# Patient Record
Sex: MALE | Race: WHITE | NOT HISPANIC OR LATINO | Employment: OTHER | URBAN - METROPOLITAN AREA
[De-identification: names, ages, dates, MRNs, and addresses within clinical notes are randomized per-mention and may not be internally consistent; named-entity substitution may affect disease eponyms.]

---

## 2017-06-22 PROBLEM — J96.01 ACUTE HYPOXEMIC RESPIRATORY FAILURE: Status: ACTIVE | Noted: 2017-06-22

## 2017-06-22 PROBLEM — J18.9 PNEUMONIA DUE TO INFECTIOUS ORGANISM: Status: ACTIVE | Noted: 2017-06-22

## 2017-06-23 PROBLEM — J80 ARDS (ADULT RESPIRATORY DISTRESS SYNDROME): Status: ACTIVE | Noted: 2017-06-23

## 2017-06-25 PROBLEM — J80 ARDS (ADULT RESPIRATORY DISTRESS SYNDROME): Status: ACTIVE | Noted: 2017-06-25

## 2018-02-26 ENCOUNTER — HOSPITAL ENCOUNTER (OUTPATIENT)
Dept: RADIOLOGY | Facility: HOSPITAL | Age: 83
Discharge: HOME | End: 2018-02-26
Attending: INTERNAL MEDICINE
Payer: MEDICARE

## 2018-02-26 ENCOUNTER — OFFICE VISIT (OUTPATIENT)
Dept: RHEUMATOLOGY | Facility: CLINIC | Age: 83
End: 2018-02-26
Payer: MEDICARE

## 2018-02-26 VITALS
SYSTOLIC BLOOD PRESSURE: 124 MMHG | DIASTOLIC BLOOD PRESSURE: 61 MMHG | HEART RATE: 77 BPM | WEIGHT: 163 LBS | BODY MASS INDEX: 22.08 KG/M2 | HEIGHT: 72 IN

## 2018-02-26 DIAGNOSIS — J84.10 PULMONARY FIBROSIS: Primary | ICD-10-CM

## 2018-02-26 DIAGNOSIS — R06.02 SHORTNESS OF BREATH: ICD-10-CM

## 2018-02-26 DIAGNOSIS — R76.8 ANA POSITIVE: ICD-10-CM

## 2018-02-26 DIAGNOSIS — R91.8 LUNG MASS: ICD-10-CM

## 2018-02-26 DIAGNOSIS — R53.1 WEAKNESS: ICD-10-CM

## 2018-02-26 DIAGNOSIS — G14 POST-POLIO SYNDROME: ICD-10-CM

## 2018-02-26 PROCEDURE — 71250 CT THORAX DX C-: CPT | Mod: 26,,, | Performed by: RADIOLOGY

## 2018-02-26 PROCEDURE — 99205 OFFICE O/P NEW HI 60 MIN: CPT | Mod: S$PBB,,, | Performed by: INTERNAL MEDICINE

## 2018-02-26 PROCEDURE — 99999 PR PBB SHADOW E&M-EST. PATIENT-LVL III: CPT | Mod: PBBFAC,,, | Performed by: INTERNAL MEDICINE

## 2018-02-26 PROCEDURE — 99213 OFFICE O/P EST LOW 20 MIN: CPT | Mod: PBBFAC,25,PO | Performed by: INTERNAL MEDICINE

## 2018-02-26 PROCEDURE — 71250 CT THORAX DX C-: CPT | Mod: TC,PO

## 2018-02-26 RX ORDER — OMEPRAZOLE 20 MG/1
CAPSULE, DELAYED RELEASE ORAL
COMMUNITY
Start: 2018-02-19 | End: 2018-08-14

## 2018-02-26 RX ORDER — PREDNISONE 10 MG/1
TABLET ORAL
COMMUNITY
Start: 2017-12-28 | End: 2018-08-14

## 2018-02-26 RX ORDER — PREDNISONE 10 MG/1
20 TABLET ORAL EVERY MORNING
Status: ON HOLD | COMMUNITY
Start: 2018-01-29 | End: 2019-11-29 | Stop reason: HOSPADM

## 2018-02-26 RX ORDER — FLUOROURACIL 50 MG/G
CREAM TOPICAL
COMMUNITY
Start: 2018-01-31 | End: 2018-08-14

## 2018-02-26 RX ORDER — ACYCLOVIR 200 MG/5ML
SUSPENSION ORAL
COMMUNITY
Start: 2018-01-31 | End: 2018-02-26

## 2018-02-26 ASSESSMENT — ROUTINE ASSESSMENT OF PATIENT INDEX DATA (RAPID3)
PATIENT GLOBAL ASSESSMENT SCORE: 5
PAIN SCORE: 5.5
PSYCHOLOGICAL DISTRESS SCORE: 3.3
MDHAQ FUNCTION SCORE: 1.7
TOTAL RAPID3 SCORE: 5.39

## 2018-02-26 NOTE — PROGRESS NOTES
Subjective:          Chief Complaint: Fly Collier is a 82 y.o. male who had concerns including elevated inflammation marker (Dr. Thompson referral) and Positive JOSE MANUEL.    HPI:    Patient is an 82-year-old gentleman is being referred by pulmonology for elevated inflammatory markers with a high titer positive JOSE MANUEL 1:1280 speckled, neg ANCA, neg RF, ACE leve WNL  Now with IPF improving on steroid taper. But wife noting the legs weakening on 5mg daily, better on 10 mg daily.     Patient states that in 2015 he had a c3-7 cervical fusion with myelopathy and cervical stenosis , and hx of polio as a child. Patient and wife note that since 6/2017 patient continued to be progressively weaker with decline in physical function. Patient did have weakness secondary due to his cervical stenosis. Did follow with Dr. Anthony (Neurolgoy)  MRI cervical spine:   Denies cough, cold, fevers. Patient found to have suspected pneumonia, intubated with respiratory failure with SOB that was rather acute symptomatically.   Patient was treated for PNA and was able to be extubated was in [facility] rehab July and August.   He continues on O2 now. Pending updated   Patient did have weight loss following cervical surgery but   Had penile ulcerations upon discharge to [facility]. Some minor hx of penile ulcers, but no oral ulcerations. No hx of retinal vascultiis-follows with Dr. Pedroza. Neg recent exam  No further outbreak with ulcers. Has seen dermatology with Dr. ESTEFANIA Keys.   No hx of seizures, strokes. But was diagnosed in 1990 with suspected Parkinson's, ultimately discontinued as worse on therapy. Did have second opinion  No hx of anemia,   No hx of nephritis, no hx o  No photosensitivity, hx of Melanoma remotely.       Patient is an  no exposure to berrylium, some asbestos. No other   Patient is CT of chest 8/22/2017 that showed subpleural fibrosis throughout both lungs but no specific lobar consolidation or pneumonia no pleural effusions.   He had no enlarged lymph nodes.        Component      Latest Ref Rng & Units 11/1/2017 8/22/2017   Angio Convert Enzyme      9 - 67 U/L  38   JOSE MANUEL Screen      None Detected  Detected (A)   CRP      0.00 - 0.90 mg/dL 1.30 (H) 2.70 (H)   Sed Rate      0 - 19 mm/Hr 8 10   Rheumatoid Factor      0.0 - 15.0 IU/mL  <8.6   JOSE MANUEL IgG by IFA      <1:40  1:1280 (H)   Anti KAISER-1      0 - 40 AU/mL 0    Anti-neutrophil Cytoplasmic Antibody, IgG      <1:20 <1:20      REVIEW OF SYSTEMS:    Review of Systems   Constitutional: Negative for fever, malaise/fatigue and weight loss.   HENT: Negative for sore throat.    Eyes: Negative for double vision, photophobia and redness.   Respiratory: Positive for shortness of breath. Negative for cough and wheezing.    Cardiovascular: Negative for chest pain, palpitations and orthopnea.   Gastrointestinal: Negative for abdominal pain, constipation and diarrhea.   Genitourinary: Negative for dysuria, hematuria and urgency.   Musculoskeletal: Positive for myalgias. Negative for back pain and joint pain.        Weakness     Skin: Negative for rash.   Neurological: Negative for dizziness, tingling, focal weakness and headaches.   Endo/Heme/Allergies: Does not bruise/bleed easily.   Psychiatric/Behavioral: Negative for depression, hallucinations and suicidal ideas.               Objective:            Past Medical History:   Diagnosis Date    GERD (gastroesophageal reflux disease)     Hypothyroid     Neck pain     Polio      History reviewed. No pertinent family history.  Social History   Substance Use Topics    Smoking status: Former Smoker     Years: 32.00    Smokeless tobacco: Never Used    Alcohol use 0.6 oz/week     1 Shots of liquor per week         Current Outpatient Prescriptions on File Prior to Visit   Medication Sig Dispense Refill    docusate sodium (COLACE) 100 MG capsule Take 1 capsule (100 mg total) by mouth 2 (two) times daily as needed for Constipation.  0    guaifenesin  (MUCINEX) 600 mg 12 hr tablet Take 1 tablet (600 mg total) by mouth 2 (two) times daily.      hydrocodone-acetaminophen 5-325mg (NORCO) 5-325 mg per tablet Take 1 tablet by mouth every 4 (four) hours as needed.  0    levothyroxine (SYNTHROID) 137 MCG Tab tablet Take 137 mcg by mouth before breakfast.      mirabegron (MYRBETRIQ) 50 mg Tb24 Take 50 mg by mouth nightly.      sertraline (ZOLOFT) 50 MG tablet Take 50 mg by mouth every evening.      acetaminophen (TYLENOL) 650 MG TbSR Take 1 tablet (650 mg total) by mouth every 8 (eight) hours as needed (headache, fever or mild pain).  0    albuterol (PROVENTIL) 2.5 mg /3 mL (0.083 %) nebulizer solution Take 3 mLs (2.5 mg total) by nebulization every 8 (eight) hours while awake. Rescue  0    albuterol-ipratropium 2.5mg-0.5mg/3mL (DUO-NEB) 0.5 mg-3 mg(2.5 mg base)/3 mL nebulizer solution Take 3 mLs by nebulization every 4 (four) hours as needed for Wheezing. Rescue  0    amlodipine (NORVASC) 2.5 MG tablet Take 1 tablet (2.5 mg total) by mouth once daily.      enoxaparin (LOVENOX) 40 mg/0.4 mL Syrg Inject 0.4 mLs (40 mg total) into the skin every 24 hours as needed.      omeprazole (PRILOSEC) 40 MG capsule Take 40 mg by mouth daily as needed.       polyethylene glycol (GLYCOLAX) 17 gram PwPk Take 17 g by mouth daily as needed.  0     No current facility-administered medications on file prior to visit.        Vitals:    02/26/18 1257   BP: 124/61   Pulse: 77       Physical Exam:    Physical Exam   Constitutional: He appears well-developed and well-nourished.   HENT:   Head: Atraumatic.   Nose: No nasal deformity.   Mouth/Throat: No oral lesions. No dental caries.   Eyes: Pupils are equal, round, and reactive to light. Right conjunctiva is not injected. Left conjunctiva is not injected.   Neck: No JVD present.   Cardiovascular: Normal rate and regular rhythm.    Pulmonary/Chest: Effort normal and breath sounds normal.   Abdominal: Soft. Bowel sounds are normal.  There is no hepatosplenomegaly.   Musculoskeletal:        Right shoulder: He exhibits normal range of motion, no tenderness, no effusion and no crepitus.        Left shoulder: He exhibits normal range of motion, no tenderness, no effusion and no crepitus.        Right elbow: He exhibits normal range of motion and no effusion. No tenderness found.        Left elbow: He exhibits normal range of motion and no effusion. No tenderness found.        Right wrist: He exhibits normal range of motion, no tenderness and no swelling.        Left wrist: He exhibits normal range of motion, no tenderness and no swelling.        Right hip: He exhibits normal range of motion, no tenderness and no swelling.        Left hip: He exhibits normal range of motion, no tenderness and no swelling.        Right knee: He exhibits normal range of motion and no swelling. No tenderness found.        Left knee: He exhibits normal range of motion and no swelling. No tenderness found.        Right ankle: He exhibits normal range of motion and no swelling. No tenderness.        Left ankle: He exhibits normal range of motion and no swelling. No tenderness.   Patient with decreased strength on rising from seated. He does have weakness relative to expected with 4+/5 in UE  And 3/5 in hip flexors, 4/5 quads.      Lymphadenopathy:     He has no cervical adenopathy.   Neurological: He has normal strength.   Skin: Skin is warm, dry and intact.   Psychiatric: He has a normal mood and affect.             Assessment:       Encounter Diagnoses   Name Primary?    Pulmonary fibrosis Yes    JOSE MANUEL positive     Post-polio syndrome           Plan:        Pulmonary fibrosis  -     JOSE MANUEL; Future; Expected date: 02/26/2018  -     Anti Sm/RNP Antibody; Future; Expected date: 02/26/2018  -     Anti-DNA antibody, double-stranded; Future; Expected date: 02/26/2018  -     Anti-Histone Antibody; Future; Expected date: 02/26/2018  -     Anti-Smith antibody; Future; Expected  date: 02/26/2018  -     C3 complement; Future; Expected date: 02/26/2018  -     C4 complement; Future; Expected date: 02/26/2018  -     CK; Future; Expected date: 02/26/2018  -     Lactate dehydrogenase; Future; Expected date: 02/26/2018  -     Complement, total; Future; Expected date: 02/26/2018  -     Sjogrens syndrome-A extractable nuclear antibody; Future; Expected date: 02/26/2018  -     Sjogrens syndrome-B extractable nuclear antibody; Future; Expected date: 02/26/2018  -     Sedimentation rate, manual; Future; Expected date: 02/26/2018  -     Cyclic citrul peptide antibody, IgG; Future; Expected date: 02/26/2018    JOSE MANUEL positive  -     JOSE MANUEL; Future; Expected date: 02/26/2018  -     Anti Sm/RNP Antibody; Future; Expected date: 02/26/2018  -     Anti-DNA antibody, double-stranded; Future; Expected date: 02/26/2018  -     Anti-Histone Antibody; Future; Expected date: 02/26/2018  -     Anti-Smith antibody; Future; Expected date: 02/26/2018  -     C3 complement; Future; Expected date: 02/26/2018  -     C4 complement; Future; Expected date: 02/26/2018  -     CK; Future; Expected date: 02/26/2018  -     Lactate dehydrogenase; Future; Expected date: 02/26/2018  -     Complement, total; Future; Expected date: 02/26/2018  -     Sjogrens syndrome-A extractable nuclear antibody; Future; Expected date: 02/26/2018  -     Sjogrens syndrome-B extractable nuclear antibody; Future; Expected date: 02/26/2018  -     Sedimentation rate, manual; Future; Expected date: 02/26/2018  -     Cyclic citrul peptide antibody, IgG; Future; Expected date: 02/26/2018    Post-polio syndrome    Weakness    Patient with idiopathic pulmonary fibrosis. With + JOSE MANUEL, elevated CRP.   Unlikely Behcets agree with Derm  Would r/o inflammatory myopathy.   He does also have extensive cervical spondylosis/stenosis that could be cause of some of weakness in LE.     Follow-up in about 4 weeks (around 3/26/2018).      60min consultation with greater than 50%  spent in counseling, chart review and coordination of care. All questions answered.  Thank you for allowing me to participate in the care of this very pleasant patient.

## 2018-02-26 NOTE — LETTER
March 5, 2018      Power Bryson MD  [facility]  [address]  [facility] [address]           [facility]  [address] [facility] [address] [place] [address]  Phone: [phone]  Fax: [phone]          Patient: Fly Collier   MR Number: [id number]   YOB: 1935   Date of Visit: 2/26/2018       Dear Dr. Power Bryson:    Thank you for referring Fly Collier to me for evaluation. Attached you will find relevant portions of my assessment and plan of care.    If you have questions, please do not hesitate to call me. I look forward to following Fly Collier along with you.    Sincerely,    Mandy Cronin, DO    Enclosure  CC:  No Recipients    If you would like to receive this communication electronically, please contact [email or web address] or [phone] to request more information on [employer] Link access.    For providers and/or their staff who would like to refer a patient to [facility], please contact us through our one-stop-shop provider referral line, [facility], at [phone].    If you feel you have received this communication in error or would no longer like to receive these types of communications, please e-mail [email or web address]

## 2018-03-07 DIAGNOSIS — M35.1 MCTD (MIXED CONNECTIVE TISSUE DISEASE): Primary | ICD-10-CM

## 2018-03-09 ENCOUNTER — LAB VISIT (OUTPATIENT)
Dept: LAB | Facility: HOSPITAL | Age: 83
End: 2018-03-09
Attending: INTERNAL MEDICINE
Payer: MEDICARE

## 2018-03-09 DIAGNOSIS — M35.1 MCTD (MIXED CONNECTIVE TISSUE DISEASE): ICD-10-CM

## 2018-03-09 PROCEDURE — 36415 COLL VENOUS BLD VENIPUNCTURE: CPT | Mod: PO

## 2018-03-28 ENCOUNTER — OFFICE VISIT (OUTPATIENT)
Dept: RHEUMATOLOGY | Facility: CLINIC | Age: 83
End: 2018-03-28
Payer: MEDICARE

## 2018-03-28 VITALS
SYSTOLIC BLOOD PRESSURE: 110 MMHG | HEIGHT: 72 IN | HEART RATE: 84 BPM | DIASTOLIC BLOOD PRESSURE: 70 MMHG | BODY MASS INDEX: 22.08 KG/M2 | WEIGHT: 163 LBS

## 2018-03-28 DIAGNOSIS — M62.81 POST-POLIO LIMB MUSCLE WEAKNESS: ICD-10-CM

## 2018-03-28 DIAGNOSIS — M35.1 MCTD (MIXED CONNECTIVE TISSUE DISEASE): Primary | ICD-10-CM

## 2018-03-28 DIAGNOSIS — J84.10 PULMONARY FIBROSIS: ICD-10-CM

## 2018-03-28 DIAGNOSIS — B91 POST-POLIO LIMB MUSCLE WEAKNESS: ICD-10-CM

## 2018-03-28 LAB
ANTI-PM/SCL AB: <20 UNITS
ANTI-SS-A 52 KD AB, IGG: <20 UNITS
EJ AB SER QL: NEGATIVE
ENA JO1 AB SER IA-ACNC: <20 UNITS
ENA SM+RNP AB SER IA-ACNC: 124 UNITS
FIBRILLARIN (U3 RNP): NEGATIVE
KU AB SER QL: ABNORMAL
MDA-5 (P140): <20 UNITS
MI2 AB SER QL: NEGATIVE
NXP-2 (P140): <20 UNITS
OJ AB SER QL: NEGATIVE
PL12 AB SER QL: NEGATIVE
PL7 AB SER QL: NEGATIVE
SRP AB SERPL QL: NEGATIVE
TIF1 GAMMA (P155/140): <20 UNITS
U2 SNRNP: NEGATIVE

## 2018-03-28 PROCEDURE — 99214 OFFICE O/P EST MOD 30 MIN: CPT | Mod: S$PBB,,, | Performed by: INTERNAL MEDICINE

## 2018-03-28 PROCEDURE — 99999 PR PBB SHADOW E&M-EST. PATIENT-LVL III: CPT | Mod: PBBFAC,,, | Performed by: INTERNAL MEDICINE

## 2018-03-28 PROCEDURE — 99213 OFFICE O/P EST LOW 20 MIN: CPT | Mod: PBBFAC,PO | Performed by: INTERNAL MEDICINE

## 2018-03-28 RX ORDER — OXYBUTYNIN CHLORIDE 5 MG/1
TABLET ORAL
COMMUNITY
Start: 2018-03-12 | End: 2018-08-14

## 2018-03-28 NOTE — LETTER
April 6, 2018        Power Bryson MD  [address]  [address]  [place] [facility] [address]             [place] [facility]ology  [address] [address]  Phone: [phone]  Fax: [phone]   Patient: Fly Collier   MR Number: [id number]   YOB: 1935   Date of Visit: 3/28/2018       Dear Dr. Bryson:    Thank you for referring Fly Collier to me for evaluation. Attached you will find relevant portions of my assessment and plan of care.    If you have questions, please do not hesitate to call me. I look forward to following Fly Collier along with you.    Sincerely,      Mandy Cronin, DO            CC  No Recipients    Enclosure

## 2018-03-28 NOTE — PROGRESS NOTES
Subjective:          Chief Complaint: Fly Collier is a 82 y.o. male who had no chief complaint listed for this encounter.    HPI:    Patient returns today following a consultation was found to have a significantly worsening IPF pattern with peripheral honeycombing.  Seems to be responding well with prednisone.  DLCO at 29% per notes from Dr. Bryson  Initial labs returning from me do show a very high titered SM/RNP which can be associated with mixed connective tissue diseases on the other thought being myositis his initial Airam 1 was negative and have the patient return to order mild marker 3 panel which includes in SRP and other myositis specific antibodies.   CPK is low with a corresponding normal LDH.    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    To reiterate the history:    Patient is an 82-year-old gentleman is being referred by pulmonology for elevated inflammatory markers with a high titer positive JOSE MANUEL 1:1280 speckled, neg ANCA, neg RF, ACE leve WNL  Now with IPF improving on steroid taper. But wife noting the legs weakening on 5mg daily, better on 10 mg daily.     Patient states that in 2015 he had a c3-7 cervical fusion with myelopathy and cervical stenosis , and hx of polio as a child. Patient and wife note that since 6/2017 patient continued to be progressively weaker with decline in physical function. Patient did have weakness secondary due to his cervical stenosis. Did follow with Dr. Anthony (Neurolgoy)  MRI cervical spine:   Denies cough, cold, fevers. Patient found to have suspected pneumonia, intubated with respiratory failure with SOB that was rather acute symptomatically.   Patient was treated for PNA and was able to be extubated was in [facility] rehab July and August.   He continues on O2 now. Pending updated   Patient did have weight loss following cervical surgery but   Had penile ulcerations upon discharge to [facility]. Some minor  hx of penile ulcers, but no oral ulcerations. No hx of retinal vascultiis-follows with Dr. Pedroza. Neg recent exam  No further outbreak with ulcers. Has seen dermatology with Dr. ESTEFANIA Keys.   No hx of seizures, strokes. But was diagnosed in 1990 with suspected Parkinson's, ultimately discontinued as worse on therapy. Did have second opinion  No hx of anemia,   No hx of nephritis, no hx o  No photosensitivity, hx of Melanoma remotely.       Patient is an  no exposure to berrylium, some asbestos. No other   Patient is CT of chest 8/22/2017 that showed subpleural fibrosis throughout both lungs but no specific lobar consolidation or pneumonia no pleural effusions.  He had no enlarged lymph nodes.  Component      Latest Ref Rng & Units 2/26/2018 11/1/2017   Anti Sm/RNP Antibody      0.00 - 19.99 .94 (H)    Anti-Sm/RNP Interpretation      Negative Positive (A)    Anti Sm Antibody      0.00 - 19.99 EU 3.48    Anti-Sm Interpretation      Negative Negative    Anti-SSA Antibody      0.00 - 19.99 EU 1.75    Anti-SSA Interpretation      Negative Negative    Anti-SSB Antibody      0.00 - 19.99 EU 0.56    Anti-SSB Interpretation      Negative Negative    JOSE MANUEL IgG by IFA      <1:40     Sed Rate      0 - 10 mm/Hr 20 (H) 8   CRP      0.00 - 0.90 mg/dL  1.30 (H)   Anti KAISER-1      0 - 40 AU/mL  0   Anti-neutrophil Cytoplasmic Antibody, IgG      <1:20  <1:20   JOSE MANUEL Screen      Negative <1:160 Positive (A)    ds DNA Ab      Negative 1:10 Negative 1:10    Anti-Histone Antibody      0.0 - 0.9 Units 0.3    Complement (C-3)      50 - 180 mg/dL 142    Complement (C-4)      11 - 44 mg/dL 36    CPK      20 - 200 U/L 21    LD      110 - 260 U/L 233    Complement,Total, Serum      42 - 95 U/mL 88    CCP Antibodies      <5.0 U/mL <0.5    JOSE MANUEL HEP-2 Titer       Positive 1:1280 Speckled      Component      Latest Ref Rng & Units 8/22/2017   Anti Sm/RNP Antibody      0.00 - 19.99 EU    Anti-Sm/RNP Interpretation       Negative    Anti Sm Antibody      0.00 - 19.99 EU    Anti-Sm Interpretation      Negative    Anti-SSA Antibody      0.00 - 19.99 EU    Anti-SSA Interpretation      Negative    Anti-SSB Antibody      0.00 - 19.99 EU    Anti-SSB Interpretation      Negative    JOSE MANUEL IgG by IFA      <1:40 1:1280 (H)   Sed Rate      0 - 10 mm/Hr    CRP      0.00 - 0.90 mg/dL    Anti KAISER-1      0 - 40 AU/mL    Anti-neutrophil Cytoplasmic Antibody, IgG      <1:20    JOSE MANUEL Screen      Negative <1:160    ds DNA Ab      Negative 1:10    Anti-Histone Antibody      0.0 - 0.9 Units    Complement (C-3)      50 - 180 mg/dL    Complement (C-4)      11 - 44 mg/dL    CPK      20 - 200 U/L    LD      110 - 260 U/L    Complement,Total, Serum      42 - 95 U/mL    CCP Antibodies      <5.0 U/mL    JOSE MANUEL HEP-2 Titer                Component      Latest Ref Rng & Units 11/1/2017 8/22/2017   Angio Convert Enzyme      9 - 67 U/L  38   JOSE MANUEL Screen      None Detected  Detected (A)   CRP      0.00 - 0.90 mg/dL 1.30 (H) 2.70 (H)   Sed Rate      0 - 19 mm/Hr 8 10   Rheumatoid Factor      0.0 - 15.0 IU/mL  <8.6   JOSE MANUEL IgG by IFA      <1:40  1:1280 (H)   Anti KAISER-1      0 - 40 AU/mL 0    Anti-neutrophil Cytoplasmic Antibody, IgG      <1:20 <1:20      REVIEW OF SYSTEMS:    Review of Systems   Constitutional: Negative for fever, malaise/fatigue and weight loss.   HENT: Negative for sore throat.    Eyes: Negative for double vision, photophobia and redness.   Respiratory: Positive for shortness of breath. Negative for cough and wheezing.    Cardiovascular: Negative for chest pain, palpitations and orthopnea.   Gastrointestinal: Negative for abdominal pain, constipation and diarrhea.   Genitourinary: Negative for dysuria, hematuria and urgency.   Musculoskeletal: Positive for myalgias. Negative for back pain and joint pain.        Weakness     Skin: Negative for rash.   Neurological: Negative for dizziness, tingling, focal weakness and headaches.   Endo/Heme/Allergies: Does not  bruise/bleed easily.   Psychiatric/Behavioral: Negative for depression, hallucinations and suicidal ideas.               Objective:            Past Medical History:   Diagnosis Date    GERD (gastroesophageal reflux disease)     Hypothyroid     Neck pain     Polio      No family history on file.  Social History   Substance Use Topics    Smoking status: Former Smoker     Years: 32.00    Smokeless tobacco: Never Used    Alcohol use 0.6 oz/week     1 Shots of liquor per week         Current Outpatient Prescriptions on File Prior to Visit   Medication Sig Dispense Refill    acetaminophen (TYLENOL) 650 MG TbSR Take 1 tablet (650 mg total) by mouth every 8 (eight) hours as needed (headache, fever or mild pain).  0    albuterol (PROVENTIL) 2.5 mg /3 mL (0.083 %) nebulizer solution Take 3 mLs (2.5 mg total) by nebulization every 8 (eight) hours while awake. Rescue  0    albuterol-ipratropium 2.5mg-0.5mg/3mL (DUO-NEB) 0.5 mg-3 mg(2.5 mg base)/3 mL nebulizer solution Take 3 mLs by nebulization every 4 (four) hours as needed for Wheezing. Rescue  0    amlodipine (NORVASC) 2.5 MG tablet Take 1 tablet (2.5 mg total) by mouth once daily.      docusate sodium (COLACE) 100 MG capsule Take 1 capsule (100 mg total) by mouth 2 (two) times daily as needed for Constipation.  0    enoxaparin (LOVENOX) 40 mg/0.4 mL Syrg Inject 0.4 mLs (40 mg total) into the skin every 24 hours as needed.      fluorouracil (EFUDEX) 5 % cream       guaifenesin (MUCINEX) 600 mg 12 hr tablet Take 1 tablet (600 mg total) by mouth 2 (two) times daily.      hydrocodone-acetaminophen 5-325mg (NORCO) 5-325 mg per tablet Take 1 tablet by mouth every 4 (four) hours as needed.  0    levothyroxine (SYNTHROID) 137 MCG Tab tablet Take 137 mcg by mouth before breakfast.      mirabegron (MYRBETRIQ) 50 mg Tb24 Take 50 mg by mouth nightly.      omeprazole (PRILOSEC) 20 MG capsule       omeprazole (PRILOSEC) 40 MG capsule Take 40 mg by mouth daily as  needed.       polyethylene glycol (GLYCOLAX) 17 gram PwPk Take 17 g by mouth daily as needed.  0    predniSONE (DELTASONE) 10 MG tablet       predniSONE (DELTASONE) 5 MG tablet       sertraline (ZOLOFT) 50 MG tablet Take 50 mg by mouth every evening.       No current facility-administered medications on file prior to visit.        There were no vitals filed for this visit.    Physical Exam:    Physical Exam   Constitutional: He appears well-developed and well-nourished.   HENT:   Head: Atraumatic.   Nose: No nasal deformity.   Mouth/Throat: No oral lesions. No dental caries.   Eyes: Pupils are equal, round, and reactive to light. Right conjunctiva is not injected. Left conjunctiva is not injected.   Neck: No JVD present.   Cardiovascular: Normal rate and regular rhythm.    Pulmonary/Chest: Effort normal and breath sounds normal.   Abdominal: Soft. Bowel sounds are normal. There is no hepatosplenomegaly.   Musculoskeletal:        Right shoulder: He exhibits normal range of motion, no tenderness, no effusion and no crepitus.        Left shoulder: He exhibits normal range of motion, no tenderness, no effusion and no crepitus.        Right elbow: He exhibits normal range of motion and no effusion. No tenderness found.        Left elbow: He exhibits normal range of motion and no effusion. No tenderness found.        Right wrist: He exhibits normal range of motion, no tenderness and no swelling.        Left wrist: He exhibits normal range of motion, no tenderness and no swelling.        Right hip: He exhibits normal range of motion, no tenderness and no swelling.        Left hip: He exhibits normal range of motion, no tenderness and no swelling.        Right knee: He exhibits normal range of motion and no swelling. No tenderness found.        Left knee: He exhibits normal range of motion and no swelling. No tenderness found.        Right ankle: He exhibits normal range of motion and no swelling. No tenderness.         Left ankle: He exhibits normal range of motion and no swelling. No tenderness.   Patient with decreased strength on rising from seated. He does have weakness relative to expected with 4+/5 in UE  And 3/5 in hip flexors, 4/5 quads.      Lymphadenopathy:     He has no cervical adenopathy.   Neurological: He has normal strength.   Skin: Skin is warm, dry and intact.   Psychiatric: He has a normal mood and affect.             Assessment:       Encounter Diagnoses   Name Primary?    MCTD (mixed connective tissue disease) Yes    Pulmonary fibrosis     Post-polio limb muscle weakness           Plan:        MCTD (mixed connective tissue disease)    Pulmonary fibrosis    Post-polio limb muscle weakness    Patient with idiopathic pulmonary fibrosis. With + JOSE MANUEL, elevated CRP.  Extensive inflammatory myopathy labs returning today during visit and only thing positive continues to be the RNP antibody given this high titer be most consistent of a mixed connective tissue disease but he has no inflammatory myopathy associated antibodies.    He does also have extensive cervical spondylosis/stenosis that could be cause of some of weakness in LE.   Discussed with wife and patient is actually doing quite well and is tolerating a wean of the prednisone as of recently she was struggling with before.  His breathing is stable his weakness is stable and I think for now we'll just continue to monitor him.    Follow-up in about 4 months (around 7/28/2018).      30min consultation with greater than 50% spent in counseling, chart review and coordination of care. All questions answered.

## 2018-04-17 ENCOUNTER — TELEPHONE (OUTPATIENT)
Dept: RHEUMATOLOGY | Facility: CLINIC | Age: 83
End: 2018-04-17

## 2018-04-17 NOTE — TELEPHONE ENCOUNTER
----- Message from Ghazal Schuler sent at 4/17/2018  8:27 AM CDT -----  Contact: Beth Bo from [facility] called to speak to Dr. Cronin about patient. Please contact [phone]    Spoke to Dr. Bo's nurse. He wants a call to discuss radiation concerning this mutual patient.

## 2018-07-05 ENCOUNTER — HOSPITAL ENCOUNTER (OUTPATIENT)
Dept: RADIOLOGY | Facility: HOSPITAL | Age: 83
Discharge: HOME | End: 2018-07-05
Attending: INTERNAL MEDICINE
Payer: MEDICARE

## 2018-07-05 DIAGNOSIS — R93.89 ABNORMAL CT SCAN: ICD-10-CM

## 2018-07-05 PROCEDURE — 71250 CT THORAX DX C-: CPT | Mod: 26,,, | Performed by: RADIOLOGY

## 2018-07-05 PROCEDURE — 71250 CT THORAX DX C-: CPT | Mod: TC,PO

## 2018-08-14 ENCOUNTER — OFFICE VISIT (OUTPATIENT)
Dept: RHEUMATOLOGY | Facility: CLINIC | Age: 83
End: 2018-08-14
Payer: MEDICARE

## 2018-08-14 ENCOUNTER — LAB VISIT (OUTPATIENT)
Dept: LAB | Facility: HOSPITAL | Age: 83
End: 2018-08-14
Attending: INTERNAL MEDICINE
Payer: MEDICARE

## 2018-08-14 VITALS
HEART RATE: 70 BPM | SYSTOLIC BLOOD PRESSURE: 110 MMHG | BODY MASS INDEX: 22.08 KG/M2 | DIASTOLIC BLOOD PRESSURE: 65 MMHG | HEIGHT: 72 IN | WEIGHT: 163 LBS

## 2018-08-14 DIAGNOSIS — M35.1 MCTD (MIXED CONNECTIVE TISSUE DISEASE): ICD-10-CM

## 2018-08-14 DIAGNOSIS — J84.10 PULMONARY FIBROSIS: ICD-10-CM

## 2018-08-14 DIAGNOSIS — M35.1 MCTD (MIXED CONNECTIVE TISSUE DISEASE): Primary | ICD-10-CM

## 2018-08-14 LAB
CK SERPL-CCNC: 23 U/L
CRP SERPL-MCNC: 32.6 MG/L
ERYTHROCYTE [SEDIMENTATION RATE] IN BLOOD BY WESTERGREN METHOD: 38 MM/HR

## 2018-08-14 PROCEDURE — 36415 COLL VENOUS BLD VENIPUNCTURE: CPT | Mod: PO

## 2018-08-14 PROCEDURE — 85651 RBC SED RATE NONAUTOMATED: CPT | Mod: PO

## 2018-08-14 PROCEDURE — 99213 OFFICE O/P EST LOW 20 MIN: CPT | Mod: PBBFAC,PO | Performed by: INTERNAL MEDICINE

## 2018-08-14 PROCEDURE — 82550 ASSAY OF CK (CPK): CPT

## 2018-08-14 PROCEDURE — 86140 C-REACTIVE PROTEIN: CPT

## 2018-08-14 PROCEDURE — 99214 OFFICE O/P EST MOD 30 MIN: CPT | Mod: S$PBB,,, | Performed by: INTERNAL MEDICINE

## 2018-08-14 PROCEDURE — 99999 PR PBB SHADOW E&M-EST. PATIENT-LVL III: CPT | Mod: PBBFAC,,, | Performed by: INTERNAL MEDICINE

## 2018-08-14 RX ORDER — HYDROCODONE BITARTRATE AND ACETAMINOPHEN 5; 300 MG/1; MG/1
TABLET ORAL
COMMUNITY
End: 2019-06-12 | Stop reason: CLARIF

## 2018-08-14 RX ORDER — FENOFIBRATE 160 MG/1
160 TABLET ORAL
COMMUNITY
Start: 2018-08-03 | End: 2019-06-12 | Stop reason: CLARIF

## 2018-08-14 RX ORDER — HYDROXYCHLOROQUINE SULFATE 200 MG/1
200 TABLET, FILM COATED ORAL DAILY
Qty: 30 TABLET | Refills: 6 | Status: SHIPPED | OUTPATIENT
Start: 2018-08-14 | End: 2019-04-15 | Stop reason: SDUPTHER

## 2018-08-14 RX ORDER — LEVOTHYROXINE SODIUM 137 UG/1
TABLET ORAL
COMMUNITY
End: 2018-08-14

## 2018-08-14 NOTE — PROGRESS NOTES
Subjective:          Chief Complaint: Fly Collier is a 82 y.o. male who had concerns including MCTD (4 MONTH FOLLOW UP).    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 Also recently found to have skin cancer was receiving XRT for this.   Last CT was stable 7/2018     Seems to be responding well with prednisone.    Initial labs returning from me do show a very high titered SM/RNP which can be associated with mixed connective tissue diseases on the other thought being myositis his initial Airam 1 was negative and have the patient return to order mild marker 3 panel which includes in SRP and other myositis specific antibodies.   CPK is low with a corresponding normal LDH.    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    To reiterate the history:    Patient is an 82-year-old gentleman is being referred by pulmonology for elevated inflammatory markers with a high titer positive JOSE MANUEL 1:1280 speckled, neg ANCA, neg RF, ACE leve WNL  Now with IPF improving on steroid taper. But wife noting the legs weakening on 5mg every 2nd day. Tolerating 5mg every other day. . Breathing stable.       Patient states that in 2015 he had a c3-7 cervical fusion with myelopathy and cervical stenosis , and hx of polio as a child. Patient and wife note that since 6/2017 patient continued to be progressively weaker with decline in physical function. Patient did have weakness secondary due to his cervical stenosis. Did follow with Dr. Anthony (Neurolgoy)  MRI cervical spine:   Denies cough, cold, fevers. Patient found to have suspected pneumonia, intubated with respiratory failure with SOB that was rather acute symptomatically.   Patient was treated for PNA and was able to be extubated was in [facility] rehab July and August.   He continues on O2 now. Pending updated   Patient did have weight loss following cervical surgery but   Had penile ulcerations  upon discharge to [facility] [place]. Some minor hx of penile ulcers, but no oral ulcerations. No hx of retinal vascultiis-follows with Dr. Pedroza. Neg recent exam  No further outbreak with ulcers. Has seen dermatology with Dr. ESTEFANIA Keys.   No hx of seizures, strokes. But was diagnosed in 1990 with suspected Parkinson's, ultimately discontinued as worse on therapy. Did have second opinion  No hx of anemia,   No hx of nephritis, no hx o  No photosensitivity, hx of Melanoma remotely.       Patient is an  no exposure to berrylium, some asbestos. No other   Patient is CT of chest 8/22/2017 that showed subpleural fibrosis throughout both lungs but no specific lobar consolidation or pneumonia no pleural effusions.  He had no enlarged lymph nodes.  Component      Latest Ref Rng & Units 2/26/2018 11/1/2017   Anti Sm/RNP Antibody      0.00 - 19.99 .94 (H)    Anti-Sm/RNP Interpretation      Negative Positive (A)    Anti Sm Antibody      0.00 - 19.99 EU 3.48    Anti-Sm Interpretation      Negative Negative    Anti-SSA Antibody      0.00 - 19.99 EU 1.75    Anti-SSA Interpretation      Negative Negative    Anti-SSB Antibody      0.00 - 19.99 EU 0.56    Anti-SSB Interpretation      Negative Negative    JOSE MANUEL IgG by IFA      <1:40     Sed Rate      0 - 10 mm/Hr 20 (H) 8   CRP      0.00 - 0.90 mg/dL  1.30 (H)   Anti KAISER-1      0 - 40 AU/mL  0   Anti-neutrophil Cytoplasmic Antibody, IgG      <1:20  <1:20   JOSE MANUEL Screen      Negative <1:160 Positive (A)    ds DNA Ab      Negative 1:10 Negative 1:10    Anti-Histone Antibody      0.0 - 0.9 Units 0.3    Complement (C-3)      50 - 180 mg/dL 142    Complement (C-4)      11 - 44 mg/dL 36    CPK      20 - 200 U/L 21    LD      110 - 260 U/L 233    Complement,Total, Serum      42 - 95 U/mL 88    CCP Antibodies      <5.0 U/mL <0.5    JOSE MANUEL HEP-2 Titer       Positive 1:1280 Speckled      Component      Latest Ref Rng & Units 8/22/2017   Anti Sm/RNP Antibody      0.00 - 19.99 EU     Anti-Sm/RNP Interpretation      Negative    Anti Sm Antibody      0.00 - 19.99 EU    Anti-Sm Interpretation      Negative    Anti-SSA Antibody      0.00 - 19.99 EU    Anti-SSA Interpretation      Negative    Anti-SSB Antibody      0.00 - 19.99 EU    Anti-SSB Interpretation      Negative    JOSE MANUEL IgG by IFA      <1:40 1:1280 (H)   Sed Rate      0 - 10 mm/Hr    CRP      0.00 - 0.90 mg/dL    Anti KAISER-1      0 - 40 AU/mL    Anti-neutrophil Cytoplasmic Antibody, IgG      <1:20    JOSE MANUEL Screen      Negative <1:160    ds DNA Ab      Negative 1:10    Anti-Histone Antibody      0.0 - 0.9 Units    Complement (C-3)      50 - 180 mg/dL    Complement (C-4)      11 - 44 mg/dL    CPK      20 - 200 U/L    LD      110 - 260 U/L    Complement,Total, Serum      42 - 95 U/mL    CCP Antibodies      <5.0 U/mL    JOSE MANUEL HEP-2 Titer                Component      Latest Ref Rng & Units 11/1/2017 8/22/2017   Angio Convert Enzyme      9 - 67 U/L  38   JOSE MANUEL Screen      None Detected  Detected (A)   CRP      0.00 - 0.90 mg/dL 1.30 (H) 2.70 (H)   Sed Rate      0 - 19 mm/Hr 8 10   Rheumatoid Factor      0.0 - 15.0 IU/mL  <8.6   JOSE MANUEL IgG by IFA      <1:40  1:1280 (H)   Anti KAISER-1      0 - 40 AU/mL 0    Anti-neutrophil Cytoplasmic Antibody, IgG      <1:20 <1:20      REVIEW OF SYSTEMS:    Review of Systems   Constitutional: Negative for fever, malaise/fatigue and weight loss.   HENT: Negative for sore throat.    Eyes: Negative for double vision, photophobia and redness.   Respiratory: Positive for shortness of breath. Negative for cough and wheezing.    Cardiovascular: Negative for chest pain, palpitations and orthopnea.   Gastrointestinal: Negative for abdominal pain, constipation and diarrhea.   Genitourinary: Negative for dysuria, hematuria and urgency.   Musculoskeletal: Positive for myalgias. Negative for back pain and joint pain.        Weakness     Skin: Negative for rash.   Neurological: Negative for dizziness, tingling, focal weakness and headaches.    Endo/Heme/Allergies: Does not bruise/bleed easily.   Psychiatric/Behavioral: Negative for depression, hallucinations and suicidal ideas.               Objective:            Past Medical History:   Diagnosis Date    GERD (gastroesophageal reflux disease)     Hypothyroid     Neck pain     Polio      History reviewed. No pertinent family history.  Social History     Tobacco Use    Smoking status: Former Smoker     Years: 32.00    Smokeless tobacco: Never Used   Substance Use Topics    Alcohol use: Yes     Alcohol/week: 0.6 oz     Types: 1 Shots of liquor per week    Drug use: No         Current Outpatient Medications on File Prior to Visit   Medication Sig Dispense Refill    docusate sodium (COLACE) 100 MG capsule Take 1 capsule (100 mg total) by mouth 2 (two) times daily as needed for Constipation.  0    fenofibrate 160 MG Tab       HYDROcodone-acetaminophen (VICODIN) 5-300 mg Tab Vicodin   5MG 1 QD      hydrocodone-acetaminophen 5-325mg (NORCO) 5-325 mg per tablet Take 1 tablet by mouth every 4 (four) hours as needed.  0    levothyroxine (SYNTHROID) 137 MCG Tab tablet Take 137 mcg by mouth before breakfast.      omeprazole (PRILOSEC) 40 MG capsule Take 40 mg by mouth daily as needed.       polyethylene glycol (GLYCOLAX) 17 gram PwPk Take 17 g by mouth daily as needed.  0    predniSONE (DELTASONE) 5 MG tablet Take 5 mg by mouth every other day.       sertraline (ZOLOFT) 50 MG tablet Take 50 mg by mouth every evening.      [DISCONTINUED] acetaminophen (TYLENOL) 650 MG TbSR Take 1 tablet (650 mg total) by mouth every 8 (eight) hours as needed (headache, fever or mild pain).  0    [DISCONTINUED] albuterol (PROVENTIL) 2.5 mg /3 mL (0.083 %) nebulizer solution Take 3 mLs (2.5 mg total) by nebulization every 8 (eight) hours while awake. Rescue  0    [DISCONTINUED] albuterol-ipratropium 2.5mg-0.5mg/3mL (DUO-NEB) 0.5 mg-3 mg(2.5 mg base)/3 mL nebulizer solution Take 3 mLs by nebulization every 4 (four)  hours as needed for Wheezing. Rescue  0    [DISCONTINUED] amlodipine (NORVASC) 2.5 MG tablet Take 1 tablet (2.5 mg total) by mouth once daily.      [DISCONTINUED] enoxaparin (LOVENOX) 40 mg/0.4 mL Syrg Inject 0.4 mLs (40 mg total) into the skin every 24 hours as needed.      [DISCONTINUED] fluorouracil (EFUDEX) 5 % cream       [DISCONTINUED] guaifenesin (MUCINEX) 600 mg 12 hr tablet Take 1 tablet (600 mg total) by mouth 2 (two) times daily.      [DISCONTINUED] levothyroxine (SYNTHROID) 137 MCG Tab tablet Synthroid 137 mcg tablet      [DISCONTINUED] mirabegron (MYRBETRIQ) 50 mg Tb24 Take 50 mg by mouth nightly.      [DISCONTINUED] omeprazole (PRILOSEC) 20 MG capsule       [DISCONTINUED] oxybutynin (DITROPAN) 5 MG Tab       [DISCONTINUED] predniSONE (DELTASONE) 10 MG tablet        No current facility-administered medications on file prior to visit.        Vitals:    08/14/18 1435   BP: 110/65   Pulse: 70       Physical Exam:    Physical Exam   Constitutional: He appears well-developed and well-nourished.   HENT:   Head: Atraumatic.   Nose: No nasal deformity.   Mouth/Throat: No oral lesions. No dental caries.   Eyes: Pupils are equal, round, and reactive to light. Right conjunctiva is not injected. Left conjunctiva is not injected.   Neck: No JVD present.   Cardiovascular: Normal rate and regular rhythm.   Pulmonary/Chest: Effort normal and breath sounds normal.   Abdominal: Soft. Bowel sounds are normal. There is no hepatosplenomegaly.   Musculoskeletal:        Right shoulder: He exhibits normal range of motion, no tenderness, no effusion and no crepitus.        Left shoulder: He exhibits normal range of motion, no tenderness, no effusion and no crepitus.        Right elbow: He exhibits normal range of motion and no effusion. No tenderness found.        Left elbow: He exhibits normal range of motion and no effusion. No tenderness found.        Right wrist: He exhibits normal range of motion, no tenderness  and no swelling.        Left wrist: He exhibits normal range of motion, no tenderness and no swelling.        Right hip: He exhibits normal range of motion, no tenderness and no swelling.        Left hip: He exhibits normal range of motion, no tenderness and no swelling.        Right knee: He exhibits normal range of motion and no swelling. No tenderness found.        Left knee: He exhibits normal range of motion and no swelling. No tenderness found.        Right ankle: He exhibits normal range of motion and no swelling. No tenderness.        Left ankle: He exhibits normal range of motion and no swelling. No tenderness.   Patient with decreased strength on rising from seated. He does have weakness relative to expected with 4+/5 in UE  And 3/5 in hip flexors, 4/5 quads.      Lymphadenopathy:     He has no cervical adenopathy.   Neurological: He has normal strength.   Skin: Skin is warm, dry and intact.   Psychiatric: He has a normal mood and affect.             Assessment:       Encounter Diagnoses   Name Primary?    MCTD (mixed connective tissue disease) Yes    Pulmonary fibrosis           Plan:        MCTD (mixed connective tissue disease)  -     Sedimentation rate; Future; Expected date: 08/14/2018  -     C-reactive protein; Standing; Expected date: 08/14/2018  -     CK; Future; Expected date: 08/14/2018  -     hydroxychloroquine (PLAQUENIL) 200 mg tablet; Take 1 tablet (200 mg total) by mouth once daily.  Dispense: 30 tablet; Refill: 6    Pulmonary fibrosis         Patient with idiopathic pulmonary fibrosis. With + JOSE MANUEL, elevated CRP. + U1RNP consistent with MCTD  Extensive inflammatory myopathy labs returning today during visit and only thing positive continues to be the RNP antibody given this high titer be most consistent of a mixed connective tissue disease but he has no inflammatory myopathy associated antibodies.    He does also have extensive cervical spondylosis/stenosis that could be cause of some of  weakness in LE.   Discussed with wife and patient is actually doing quite well and is tolerating a wean of the prednisone as of recently she was struggling with before.    His breathing is stable starting to wean Prednisone now on 5mg every other day and doing ok  I am going to try to Hydroxychloroquine 200mg daily (eye exam next in 11/2018) I am not sure this will offer much control for progression of his lungs but do not feel Mycophenolate at this juncture to be prudent.   Recent Squamous cell carcinoma.     Follow-up in about 4 months (around 12/14/2018).      30min consultation with greater than 50% spent in counseling, chart review and coordination of care. All questions answered.

## 2018-10-16 ENCOUNTER — HOSPITAL ENCOUNTER (OUTPATIENT)
Dept: RADIOLOGY | Facility: HOSPITAL | Age: 83
Discharge: HOME | End: 2018-10-16
Attending: PAIN MEDICINE
Payer: MEDICARE

## 2018-10-16 ENCOUNTER — HOSPITAL ENCOUNTER (OUTPATIENT)
Dept: RADIOLOGY | Facility: HOSPITAL | Age: 83
Discharge: HOME | End: 2018-10-16
Attending: INTERNAL MEDICINE
Payer: MEDICARE

## 2018-10-16 DIAGNOSIS — M62.830 MUSCLE SPASM OF BACK: ICD-10-CM

## 2018-10-16 DIAGNOSIS — R91.8 LUNG MASS: ICD-10-CM

## 2018-10-16 DIAGNOSIS — G89.4 CHRONIC PAIN SYNDROME: ICD-10-CM

## 2018-10-16 DIAGNOSIS — M54.2 CERVICALGIA: ICD-10-CM

## 2018-10-16 DIAGNOSIS — J84.9 ACUTE INTERSTITIAL PNEUMONIA: ICD-10-CM

## 2018-10-16 DIAGNOSIS — J96.11 CHRONIC HYPOXEMIC RESPIRATORY FAILURE: ICD-10-CM

## 2018-10-16 PROCEDURE — 72141 MRI NECK SPINE W/O DYE: CPT | Mod: 26,,, | Performed by: RADIOLOGY

## 2018-10-16 PROCEDURE — 71250 CT THORAX DX C-: CPT | Mod: 26,,, | Performed by: RADIOLOGY

## 2018-10-16 PROCEDURE — 72040 X-RAY EXAM NECK SPINE 2-3 VW: CPT | Mod: TC,FY,PO

## 2018-10-16 PROCEDURE — 72141 MRI NECK SPINE W/O DYE: CPT | Mod: TC,PO

## 2018-10-16 PROCEDURE — 72040 X-RAY EXAM NECK SPINE 2-3 VW: CPT | Mod: 26,,, | Performed by: RADIOLOGY

## 2018-10-16 PROCEDURE — 71250 CT THORAX DX C-: CPT | Mod: TC,PO

## 2019-01-07 ENCOUNTER — OFFICE VISIT (OUTPATIENT)
Dept: RHEUMATOLOGY | Facility: CLINIC | Age: 84
End: 2019-01-07
Payer: MEDICARE

## 2019-01-07 VITALS
WEIGHT: 166.56 LBS | BODY MASS INDEX: 22.56 KG/M2 | HEART RATE: 78 BPM | HEIGHT: 72 IN | DIASTOLIC BLOOD PRESSURE: 66 MMHG | SYSTOLIC BLOOD PRESSURE: 113 MMHG

## 2019-01-07 DIAGNOSIS — M35.1 MIXED CONNECTIVE TISSUE DISEASE: Primary | ICD-10-CM

## 2019-01-07 DIAGNOSIS — J84.10 PULMONARY FIBROSIS: ICD-10-CM

## 2019-01-07 PROCEDURE — 99213 OFFICE O/P EST LOW 20 MIN: CPT | Mod: PBBFAC,PO | Performed by: INTERNAL MEDICINE

## 2019-01-07 PROCEDURE — 99999 PR PBB SHADOW E&M-EST. PATIENT-LVL III: ICD-10-PCS | Mod: PBBFAC,,, | Performed by: INTERNAL MEDICINE

## 2019-01-07 PROCEDURE — 99214 OFFICE O/P EST MOD 30 MIN: CPT | Mod: S$PBB,,, | Performed by: INTERNAL MEDICINE

## 2019-01-07 PROCEDURE — 99999 PR PBB SHADOW E&M-EST. PATIENT-LVL III: CPT | Mod: PBBFAC,,, | Performed by: INTERNAL MEDICINE

## 2019-01-07 PROCEDURE — 99214 PR OFFICE/OUTPT VISIT, EST, LEVL IV, 30-39 MIN: ICD-10-PCS | Mod: S$PBB,,, | Performed by: INTERNAL MEDICINE

## 2019-01-07 RX ORDER — OXYBUTYNIN CHLORIDE 5 MG/1
TABLET ORAL
COMMUNITY
End: 2019-06-12 | Stop reason: CLARIF

## 2019-01-07 RX ORDER — SILVER SULFADIAZINE 10 G/1000G
CREAM TOPICAL
Refills: 2 | COMMUNITY
Start: 2018-10-19 | End: 2019-06-12 | Stop reason: CLARIF

## 2019-01-07 RX ORDER — OXYBUTYNIN CHLORIDE 5 MG/1
TABLET ORAL
Refills: 2 | COMMUNITY
Start: 2018-12-31 | End: 2019-09-03

## 2019-01-07 NOTE — PROGRESS NOTES
Subjective:          Chief Complaint: Fly Collier is a 83 y.o. male who had concerns including mctd (4 months).    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 Also recently found to have skin cancer was receiving XRT for this.   Last CT was stable 7/2018     Seems to be responding well with prednisone.    Initial labs returning from me do show a very high titered SM/RNP which can be associated with mixed connective tissue diseases on the other thought being myositis his initial Airam 1 was negative and have the patient return to order mild marker 3 panel which includes in SRP and other myositis specific antibodies.   CPK is low with a corresponding normal LDH.    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    To reiterate the history:    Patient is an 82-year-old gentleman is being referred by pulmonology for elevated inflammatory markers with a high titer positive JOSE MANUEL 1:1280 speckled, neg ANCA, neg RF, ACE leve WNL  Now with IPF improving on steroid taper. But wife noting the legs weakening on 5mg every 2nd day. Tolerating 5mg every other day. . Breathing stable.       Patient states that in 2015 he had a c3-7 cervical fusion with myelopathy and cervical stenosis , and hx of polio as a child. Patient and wife note that since 6/2017 patient continued to be progressively weaker with decline in physical function. Patient did have weakness secondary due to his cervical stenosis. Did follow with Dr. Anthony (Neurolgoy)  MRI cervical spine:   Denies cough, cold, fevers. Patient found to have suspected pneumonia, intubated with respiratory failure with SOB that was rather acute symptomatically.   Patient was treated for PNA and was able to be extubated was in [facility] rehab July and August.   He continues on O2 now. Pending updated   Patient did have weight loss following cervical surgery but   Had penile ulcerations upon  discharge to [facility]. Some minor hx of penile ulcers, but no oral ulcerations. No hx of retinal vascultiis-follows with Dr. Pedroza. Neg recent exam  No further outbreak with ulcers. Has seen dermatology with Dr. ESTEFANIA Keys.   No hx of seizures, strokes. But was diagnosed in 1990 with suspected Parkinson's, ultimately discontinued as worse on therapy. Did have second opinion  No hx of anemia,   No hx of nephritis, no hx o  No photosensitivity, hx of Melanoma remotely.       Patient is an  no exposure to berrylium, some asbestos. No other   Patient is CT of chest 8/22/2017 that showed subpleural fibrosis throughout both lungs but no specific lobar consolidation or pneumonia no pleural effusions.  He had no enlarged lymph nodes.  Component      Latest Ref Rng & Units 2/26/2018 11/1/2017   Anti Sm/RNP Antibody      0.00 - 19.99 .94 (H)    Anti-Sm/RNP Interpretation      Negative Positive (A)    Anti Sm Antibody      0.00 - 19.99 EU 3.48    Anti-Sm Interpretation      Negative Negative    Anti-SSA Antibody      0.00 - 19.99 EU 1.75    Anti-SSA Interpretation      Negative Negative    Anti-SSB Antibody      0.00 - 19.99 EU 0.56    Anti-SSB Interpretation      Negative Negative    JOSE MANUEL IgG by IFA      <1:40     Sed Rate      0 - 10 mm/Hr 20 (H) 8   CRP      0.00 - 0.90 mg/dL  1.30 (H)   Anti KAISER-1      0 - 40 AU/mL  0   Anti-neutrophil Cytoplasmic Antibody, IgG      <1:20  <1:20   JOSE MANUEL Screen      Negative <1:160 Positive (A)    ds DNA Ab      Negative 1:10 Negative 1:10    Anti-Histone Antibody      0.0 - 0.9 Units 0.3    Complement (C-3)      50 - 180 mg/dL 142    Complement (C-4)      11 - 44 mg/dL 36    CPK      20 - 200 U/L 21    LD      110 - 260 U/L 233    Complement,Total, Serum      42 - 95 U/mL 88    CCP Antibodies      <5.0 U/mL <0.5    JOSE MANUEL HEP-2 Titer       Positive 1:1280 Speckled      Component      Latest Ref Rng & Units 8/22/2017   Anti Sm/RNP Antibody      0.00 - 19.99 EU     Anti-Sm/RNP Interpretation      Negative    Anti Sm Antibody      0.00 - 19.99 EU    Anti-Sm Interpretation      Negative    Anti-SSA Antibody      0.00 - 19.99 EU    Anti-SSA Interpretation      Negative    Anti-SSB Antibody      0.00 - 19.99 EU    Anti-SSB Interpretation      Negative    JOSE MANUEL IgG by IFA      <1:40 1:1280 (H)   Sed Rate      0 - 10 mm/Hr    CRP      0.00 - 0.90 mg/dL    Anti KAISER-1      0 - 40 AU/mL    Anti-neutrophil Cytoplasmic Antibody, IgG      <1:20    JOSE MANUEL Screen      Negative <1:160    ds DNA Ab      Negative 1:10    Anti-Histone Antibody      0.0 - 0.9 Units    Complement (C-3)      50 - 180 mg/dL    Complement (C-4)      11 - 44 mg/dL    CPK      20 - 200 U/L    LD      110 - 260 U/L    Complement,Total, Serum      42 - 95 U/mL    CCP Antibodies      <5.0 U/mL    JOSE MANUEL HEP-2 Titer                Component      Latest Ref Rng & Units 11/1/2017 8/22/2017   Angio Convert Enzyme      9 - 67 U/L  38   JOSE MANUEL Screen      None Detected  Detected (A)   CRP      0.00 - 0.90 mg/dL 1.30 (H) 2.70 (H)   Sed Rate      0 - 19 mm/Hr 8 10   Rheumatoid Factor      0.0 - 15.0 IU/mL  <8.6   JOSE MANUEL IgG by IFA      <1:40  1:1280 (H)   Anti KAISER-1      0 - 40 AU/mL 0    Anti-neutrophil Cytoplasmic Antibody, IgG      <1:20 <1:20      REVIEW OF SYSTEMS:    Review of Systems   Constitutional: Negative for fever, malaise/fatigue and weight loss.   HENT: Negative for sore throat.    Eyes: Negative for double vision, photophobia and redness.   Respiratory: Positive for shortness of breath. Negative for cough and wheezing.    Cardiovascular: Negative for chest pain, palpitations and orthopnea.   Gastrointestinal: Negative for abdominal pain, constipation and diarrhea.   Genitourinary: Negative for dysuria, hematuria and urgency.   Musculoskeletal: Positive for myalgias. Negative for back pain and joint pain.        Weakness     Skin: Negative for rash.   Neurological: Negative for dizziness, tingling, focal weakness and headaches.    Endo/Heme/Allergies: Does not bruise/bleed easily.   Psychiatric/Behavioral: Negative for depression, hallucinations and suicidal ideas.               Objective:            Past Medical History:   Diagnosis Date    GERD (gastroesophageal reflux disease)     Hypothyroid     MCTD (mixed connective tissue disease) 3/7/2018    Neck pain     Polio      History reviewed. No pertinent family history.  Social History     Tobacco Use    Smoking status: Former Smoker     Years: 32.00    Smokeless tobacco: Never Used   Substance Use Topics    Alcohol use: Yes     Alcohol/week: 0.6 oz     Types: 1 Shots of liquor per week    Drug use: No         Current Outpatient Medications on File Prior to Visit   Medication Sig Dispense Refill    docusate sodium (COLACE) 100 MG capsule Take 1 capsule (100 mg total) by mouth 2 (two) times daily as needed for Constipation.  0    hydrocodone-acetaminophen 5-325mg (NORCO) 5-325 mg per tablet Take 1 tablet by mouth every 4 (four) hours as needed.  0    hydroxychloroquine (PLAQUENIL) 200 mg tablet Take 1 tablet (200 mg total) by mouth once daily. 30 tablet 6    levothyroxine (SYNTHROID) 137 MCG Tab tablet Take 137 mcg by mouth before breakfast.      omeprazole (PRILOSEC) 40 MG capsule Take 40 mg by mouth daily as needed.       oxybutynin (DITROPAN) 5 MG Tab TK 1 T PO BID  2    oxybutynin (DITROPAN) 5 MG Tab oxybutynin chloride 5 mg tablet      polyethylene glycol (GLYCOLAX) 17 gram PwPk Take 17 g by mouth daily as needed.  0    predniSONE (DELTASONE) 5 MG tablet Take 5 mg by mouth every other day. Patient taking 7mg currently      sertraline (ZOLOFT) 50 MG tablet Take 50 mg by mouth every evening.      SSD 1 % cream APPLY AA BID  2    fenofibrate 160 MG Tab       HYDROcodone-acetaminophen (VICODIN) 5-300 mg Tab Vicodin   5MG 1 QD       No current facility-administered medications on file prior to visit.        Vitals:    01/07/19 1457   BP: 113/66   Pulse: 78        Physical Exam:    Physical Exam   Constitutional: He appears well-developed and well-nourished.   HENT:   Head: Atraumatic.   Nose: No nasal deformity.   Mouth/Throat: No oral lesions. No dental caries.   Eyes: Pupils are equal, round, and reactive to light. Right conjunctiva is not injected. Left conjunctiva is not injected.   Neck: No JVD present.   Cardiovascular: Normal rate and regular rhythm.   Pulmonary/Chest: Effort normal and breath sounds normal.   Abdominal: Soft. Bowel sounds are normal. There is no hepatosplenomegaly.   Musculoskeletal:        Right shoulder: He exhibits normal range of motion, no tenderness, no effusion and no crepitus.        Left shoulder: He exhibits normal range of motion, no tenderness, no effusion and no crepitus.        Right elbow: He exhibits normal range of motion and no effusion. No tenderness found.        Left elbow: He exhibits normal range of motion and no effusion. No tenderness found.        Right wrist: He exhibits normal range of motion, no tenderness and no swelling.        Left wrist: He exhibits normal range of motion, no tenderness and no swelling.        Right hip: He exhibits normal range of motion, no tenderness and no swelling.        Left hip: He exhibits normal range of motion, no tenderness and no swelling.        Right knee: He exhibits normal range of motion and no swelling. No tenderness found.        Left knee: He exhibits normal range of motion and no swelling. No tenderness found.        Right ankle: He exhibits normal range of motion and no swelling. No tenderness.        Left ankle: He exhibits normal range of motion and no swelling. No tenderness.   Patient with decreased strength on rising from seated. He does have weakness relative to expected with 4+/5 in UE  And 3/5 in hip flexors, 4/5 quads.      Lymphadenopathy:     He has no cervical adenopathy.   Neurological: He has normal strength.   Skin: Skin is warm, dry and intact.    Psychiatric: He has a normal mood and affect.             Assessment:       Encounter Diagnoses   Name Primary?    Mixed connective tissue disease Yes    Pulmonary fibrosis           Plan:        Mixed connective tissue disease    Pulmonary fibrosis       Patient with idiopathic pulmonary fibrosis. With + JOSE MANUEL, elevated CRP. + U1RNP consistent with MCTD  Extensive inflammatory myopathy labs returning today during visit and only thing positive continues to be the RNP antibody given this high titer be most consistent of a mixed connective tissue disease - he has no inflammatory myopathy associated antibodies.    He does also have extensive cervical spondylosis/stenosis that could be cause of some of weakness in LE.      His breathing is stable doing well on Prednsione 7mg daily which seems to be his best dose    I am going to leave Hydroxychloroquine 200mg daily (eye exam last  in 11/2018) I am not sure this will offer much control for progression of his lungs but do not feel Mycophenolate at this juncture to be prudent given risk of infection and recent skin cancer. Will send to Dr. Tavarez to be sure he is aware on  Plaquenil.     Recent Squamous cell carcinoma. Still scalp healing. Somewhat delayed will see Dr. Keys tomorrow.     Follow-up in about 4 months (around 5/7/2019).      30min consultation with greater than 50% spent in counseling, chart review and coordination of care. All questions answered.

## 2019-03-06 ENCOUNTER — HOSPITAL ENCOUNTER (OUTPATIENT)
Dept: RADIOLOGY | Facility: HOSPITAL | Age: 84
Discharge: HOME | End: 2019-03-06
Attending: INTERNAL MEDICINE
Payer: MEDICARE

## 2019-03-06 DIAGNOSIS — M79.89 SWELLING OF LIMB: ICD-10-CM

## 2019-03-06 DIAGNOSIS — J84.9 ACUTE INTERSTITIAL PNEUMONIA: ICD-10-CM

## 2019-03-06 DIAGNOSIS — R06.02 SHORTNESS OF BREATH: ICD-10-CM

## 2019-03-06 PROCEDURE — 71046 X-RAY EXAM CHEST 2 VIEWS: CPT | Mod: 26,,, | Performed by: RADIOLOGY

## 2019-03-06 PROCEDURE — 71046 X-RAY EXAM CHEST 2 VIEWS: CPT | Mod: TC,FY,PO

## 2019-03-06 PROCEDURE — 71046 XR CHEST PA AND LATERAL: ICD-10-PCS | Mod: 26,,, | Performed by: RADIOLOGY

## 2019-03-06 PROCEDURE — 93970 EXTREMITY STUDY: CPT | Mod: TC,PO

## 2019-03-06 PROCEDURE — 93970 US LOWER EXTREMITY VEINS BILATERAL: ICD-10-PCS | Mod: 26,,, | Performed by: RADIOLOGY

## 2019-03-06 PROCEDURE — 93970 EXTREMITY STUDY: CPT | Mod: 26,,, | Performed by: RADIOLOGY

## 2019-04-15 DIAGNOSIS — M35.1 MCTD (MIXED CONNECTIVE TISSUE DISEASE): ICD-10-CM

## 2019-04-15 RX ORDER — HYDROXYCHLOROQUINE SULFATE 200 MG/1
200 TABLET, FILM COATED ORAL DAILY
Qty: 30 TABLET | Refills: 6 | Status: SHIPPED | OUTPATIENT
Start: 2019-04-15 | End: 2019-10-09 | Stop reason: SDUPTHER

## 2019-05-22 ENCOUNTER — TELEPHONE (OUTPATIENT)
Dept: RHEUMATOLOGY | Facility: CLINIC | Age: 84
End: 2019-05-22

## 2019-05-22 NOTE — TELEPHONE ENCOUNTER
Spoke to wife and offered to reschedule 5-28 at noon. Patient will be at another appointment. Patient doing well on Plaquenil. Will look for cancellations. Patient not available Tuesday afternoons. CG

## 2019-06-13 PROBLEM — S22.080A COMPRESSION FRACTURE OF T12 VERTEBRA: Status: ACTIVE | Noted: 2019-06-13

## 2019-06-26 ENCOUNTER — OFFICE VISIT (OUTPATIENT)
Dept: RHEUMATOLOGY | Facility: CLINIC | Age: 84
End: 2019-06-26
Payer: MEDICARE

## 2019-06-26 VITALS
BODY MASS INDEX: 22.08 KG/M2 | HEIGHT: 72 IN | SYSTOLIC BLOOD PRESSURE: 126 MMHG | DIASTOLIC BLOOD PRESSURE: 68 MMHG | WEIGHT: 163 LBS | HEART RATE: 94 BPM

## 2019-06-26 DIAGNOSIS — J84.10 PULMONARY FIBROSIS: ICD-10-CM

## 2019-06-26 DIAGNOSIS — M81.8 STEROID-INDUCED OSTEOPOROSIS: ICD-10-CM

## 2019-06-26 DIAGNOSIS — T38.0X5A STEROID-INDUCED OSTEOPOROSIS: ICD-10-CM

## 2019-06-26 DIAGNOSIS — M35.1 MCTD (MIXED CONNECTIVE TISSUE DISEASE): Primary | ICD-10-CM

## 2019-06-26 PROCEDURE — 99213 OFFICE O/P EST LOW 20 MIN: CPT | Mod: PBBFAC,PO | Performed by: INTERNAL MEDICINE

## 2019-06-26 PROCEDURE — 99214 OFFICE O/P EST MOD 30 MIN: CPT | Mod: S$PBB,,, | Performed by: INTERNAL MEDICINE

## 2019-06-26 PROCEDURE — 99999 PR PBB SHADOW E&M-EST. PATIENT-LVL III: CPT | Mod: PBBFAC,,, | Performed by: INTERNAL MEDICINE

## 2019-06-26 PROCEDURE — 99214 PR OFFICE/OUTPT VISIT, EST, LEVL IV, 30-39 MIN: ICD-10-PCS | Mod: S$PBB,,, | Performed by: INTERNAL MEDICINE

## 2019-06-26 PROCEDURE — 99999 PR PBB SHADOW E&M-EST. PATIENT-LVL III: ICD-10-PCS | Mod: PBBFAC,,, | Performed by: INTERNAL MEDICINE

## 2019-06-26 RX ORDER — HYDROCORTISONE 25 MG/G
CREAM TOPICAL
COMMUNITY
End: 2019-08-06

## 2019-06-26 RX ORDER — ALBUTEROL SULFATE 0.83 MG/ML
2.5 SOLUTION RESPIRATORY (INHALATION) 3 TIMES DAILY PRN
Refills: 5 | COMMUNITY
Start: 2019-04-13

## 2019-06-26 RX ORDER — PANTOPRAZOLE SODIUM 40 MG/1
TABLET, DELAYED RELEASE ORAL
COMMUNITY
End: 2019-08-06

## 2019-06-26 RX ORDER — KETOCONAZOLE 20 MG/ML
SHAMPOO, SUSPENSION TOPICAL
Refills: 6 | COMMUNITY
Start: 2019-04-23 | End: 2019-09-03

## 2019-06-26 NOTE — PROGRESS NOTES
Subjective:          Chief Complaint: Fly Collier is a 83 y.o. male who had concerns including MCTD (4 MONTH).    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   On HCQ 200mg daily  pedning annual CT 7/2019. Stable O2 demands.      skin cancer completed XRT for this.   Last CT was stable 7/2018     Seems to be responding well with prednisone.  Recently increased for Pulm concerns.   Initial labs returning from me do show a very high titered SM/RNP which can be associated with mixed connective tissue diseases on the other thought being myositis his initial Airam 1 was negative and have the patient return to order mild marker 3 panel which includes in SRP and other myositis specific antibodies.   CPK is low with a corresponding normal LDH.    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    To reiterate the history:    Patient is an 82-year-old gentleman is being referred by pulmonology for elevated inflammatory markers with a high titer positive JOSE MANUEL 1:1280 speckled, neg ANCA, neg RF, ACE leve WNL  Now with IPF on higher. Breathing stable.       Patient states that in 2015 he had a c3-7 cervical fusion with myelopathy and cervical stenosis , and hx of polio as a child. Patient and wife note that since 6/2017 patient continued to be progressively weaker with decline in physical function. Patient did have weakness secondary due to his cervical stenosis. Did follow with Dr. Anthony (Neurolgoy)  MRI cervical spine:   Denies cough, cold, fevers. Patient found to have suspected pneumonia, intubated with respiratory failure with SOB that was rather acute symptomatically.   Patient was treated for PNA and was able to be extubated was in [facility] rehab July and August.   He continues on O2 now. Pending updated   Patient did have weight loss following cervical surgery but   Had penile ulcerations upon discharge to [facility]. Some minor hx of  penile ulcers, but no oral ulcerations. No hx of retinal vascultiis-follows with Dr. Pedroza. Neg recent exam  No further outbreak with ulcers. Has seen dermatology with Dr. ESTEFANIA Keys.   No hx of seizures, strokes. But was diagnosed in 1990 with suspected Parkinson's, ultimately discontinued as worse on therapy. Did have second opinion  No hx of anemia,   No hx of nephritis, no hx o  No photosensitivity, hx of Melanoma remotely.       Patient is an  no exposure to berrylium, some asbestos. No other   Patient is CT of chest 8/22/2017 that showed subpleural fibrosis throughout both lungs but no specific lobar consolidation or pneumonia no pleural effusions.  He had no enlarged lymph nodes.  Component      Latest Ref Rng & Units 2/26/2018 11/1/2017   Anti Sm/RNP Antibody      0.00 - 19.99 .94 (H)    Anti-Sm/RNP Interpretation      Negative Positive (A)    Anti Sm Antibody      0.00 - 19.99 EU 3.48    Anti-Sm Interpretation      Negative Negative    Anti-SSA Antibody      0.00 - 19.99 EU 1.75    Anti-SSA Interpretation      Negative Negative    Anti-SSB Antibody      0.00 - 19.99 EU 0.56    Anti-SSB Interpretation      Negative Negative    JOSE MANUEL IgG by IFA      <1:40     Sed Rate      0 - 10 mm/Hr 20 (H) 8   CRP      0.00 - 0.90 mg/dL  1.30 (H)   Anti [facility]-1      0 - 40 AU/mL  0   Anti-neutrophil Cytoplasmic Antibody, IgG      <1:20  <1:20   JOSE MANUEL Screen      Negative <1:160 Positive (A)    ds DNA Ab      Negative 1:10 Negative 1:10    Anti-Histone Antibody      0.0 - 0.9 Units 0.3    Complement (C-3)      50 - 180 mg/dL 142    Complement (C-4)      11 - 44 mg/dL 36    CPK      20 - 200 U/L 21    LD      110 - 260 U/L 233    Complement,Total, Serum      42 - 95 U/mL 88    CCP Antibodies      <5.0 U/mL <0.5    JOSE MANUEL HEP-2 Titer       Positive 1:1280 Speckled      Component      Latest Ref Rng & Units 8/22/2017   Anti Sm/RNP Antibody      0.00 - 19.99 EU    Anti-Sm/RNP Interpretation      Negative     Anti Sm Antibody      0.00 - 19.99 EU    Anti-Sm Interpretation      Negative    Anti-SSA Antibody      0.00 - 19.99 EU    Anti-SSA Interpretation      Negative    Anti-SSB Antibody      0.00 - 19.99 EU    Anti-SSB Interpretation      Negative    JOSE MANUEL IgG by IFA      <1:40 1:1280 (H)   Sed Rate      0 - 10 mm/Hr    CRP      0.00 - 0.90 mg/dL    Anti [facility]-1      0 - 40 AU/mL    Anti-neutrophil Cytoplasmic Antibody, IgG      <1:20    JOSE MANUEL Screen      Negative <1:160    ds DNA Ab      Negative 1:10    Anti-Histone Antibody      0.0 - 0.9 Units    Complement (C-3)      50 - 180 mg/dL    Complement (C-4)      11 - 44 mg/dL    CPK      20 - 200 U/L    LD      110 - 260 U/L    Complement,Total, Serum      42 - 95 U/mL    CCP Antibodies      <5.0 U/mL    JOSE MANUEL HEP-2 Titer                Component      Latest Ref Rng & Units 11/1/2017 8/22/2017   Angio Convert Enzyme      9 - 67 U/L  38   JOSE MANUEL Screen      None Detected  Detected (A)   CRP      0.00 - 0.90 mg/dL 1.30 (H) 2.70 (H)   Sed Rate      0 - 19 mm/Hr 8 10   Rheumatoid Factor      0.0 - 15.0 IU/mL  <8.6   JOSE MANUEL IgG by IFA      <1:40  1:1280 (H)   Anti [facility]ER-1      0 - 40 AU/mL 0    Anti-neutrophil Cytoplasmic Antibody, IgG      <1:20 <1:20      REVIEW OF SYSTEMS:    Review of Systems   Constitutional: Negative for fever, malaise/fatigue and weight loss.   HENT: Negative for sore throat.    Eyes: Negative for double vision, photophobia and redness.   Respiratory: Positive for shortness of breath. Negative for cough and wheezing.    Cardiovascular: Negative for chest pain, palpitations and orthopnea.   Gastrointestinal: Negative for abdominal pain, constipation and diarrhea.   Genitourinary: Negative for dysuria, hematuria and urgency.   Musculoskeletal: Positive for myalgias. Negative for back pain and joint pain.        Weakness     Skin: Negative for rash.   Neurological: Negative for dizziness, tingling, focal weakness and headaches.   Endo/Heme/Allergies: Does not  bruise/bleed easily.   Psychiatric/Behavioral: Negative for depression, hallucinations and suicidal ideas.               Objective:            Past Medical History:   Diagnosis Date    BPH (benign prostatic hyperplasia)     Cancer 2018    scca head with radiation    Cancer     melanoma on arm    Fine motor skill loss     General anesthetics causing adverse effect in therapeutic use     hallucinations for 4 days following neck surgery    GERD (gastroesophageal reflux disease)     H/O therapeutic radiation     H/O: pneumonia 2017    Hypothyroid     Idiopathic pulmonary fibrosis     MCTD (mixed connective tissue disease) 3/7/2018    Neck pain     Oxygen dependent     Polio     Urinary urgency      Family History   Problem Relation Age of Onset    Multiple sclerosis Mother     COPD Father      Social History     Tobacco Use    Smoking status: Former Smoker     Years: 32.00    Smokeless tobacco: Never Used   Substance Use Topics    Alcohol use: Not Currently    Drug use: No         Current Outpatient Medications on File Prior to Visit   Medication Sig Dispense Refill    docusate sodium (COLACE) 100 MG capsule Take 1 capsule (100 mg total) by mouth 2 (two) times daily as needed for Constipation.  0    hydrocodone-acetaminophen 5-325mg (NORCO) 5-325 mg per tablet Take 1 tablet by mouth every 4 (four) hours as needed. (Patient taking differently: Take 1 tablet by mouth every 8 (eight) hours as needed. )  0    hydrocortisone (PROCTOZONE-HC) 2.5 % rectal cream Proctozone-HC 2.5 % topical cream perineal applicator      hydroxychloroquine (PLAQUENIL) 200 mg tablet Take 1 tablet (200 mg total) by mouth once daily. 30 tablet 6    ketoconazole (NIZORAL) 2 % shampoo   6    levothyroxine (SYNTHROID) 150 MCG tablet Take 150 mcg by mouth before breakfast.      omeprazole (PRILOSEC) 40 MG capsule Take 40 mg by mouth daily as needed.       oxybutynin (DITROPAN) 5 MG Tab TK 1 T PO BID  2    OXYGEN-AIR  DELIVERY SYSTEMS MISC 2 L by Nasal route continuous.      pantoprazole (PROTONIX) 40 MG tablet pantoprazole 40 mg tablet,delayed release      polyethylene glycol (GLYCOLAX) 17 gram PwPk Take 17 g by mouth daily as needed.  0    predniSONE (DELTASONE) 5 MG tablet Take 20 mg by mouth once daily.       sennosides (SENNA ORAL) senna      sertraline (ZOLOFT) 50 MG tablet Take 50 mg by mouth every evening.      albuterol (PROVENTIL) 2.5 mg /3 mL (0.083 %) nebulizer solution VVN TID PRN FOR 30 DAYS  5     Current Facility-Administered Medications on File Prior to Visit   Medication Dose Route Frequency Provider Last Rate Last Dose    lactated ringers infusion   Intravenous Continuous Kyler Castro MD           Vitals:    06/26/19 0943   BP: 126/68   Pulse: 94       Physical Exam:    Physical Exam   Constitutional: He appears well-developed and well-nourished.   HENT:   Head: Atraumatic.   Nose: No nasal deformity.   Mouth/Throat: No oral lesions. No dental caries.   Eyes: Pupils are equal, round, and reactive to light. Right conjunctiva is not injected. Left conjunctiva is not injected.   Neck: No JVD present.   Cardiovascular: Normal rate and regular rhythm.   Pulmonary/Chest: Effort normal and breath sounds normal.   Abdominal: Soft. Bowel sounds are normal. There is no hepatosplenomegaly.   Musculoskeletal:        Right shoulder: He exhibits normal range of motion, no tenderness, no effusion and no crepitus.        Left shoulder: He exhibits normal range of motion, no tenderness, no effusion and no crepitus.        Right elbow: He exhibits normal range of motion and no effusion. No tenderness found.        Left elbow: He exhibits normal range of motion and no effusion. No tenderness found.        Right wrist: He exhibits normal range of motion, no tenderness and no swelling.        Left wrist: He exhibits normal range of motion, no tenderness and no swelling.        Right hip: He exhibits normal range of  motion, no tenderness and no swelling.        Left hip: He exhibits normal range of motion, no tenderness and no swelling.        Right knee: He exhibits normal range of motion and no swelling. No tenderness found.        Left knee: He exhibits normal range of motion and no swelling. No tenderness found.        Right ankle: He exhibits normal range of motion and no swelling. No tenderness.        Left ankle: He exhibits normal range of motion and no swelling. No tenderness.   Patient with decreased strength on rising from seated. He does have weakness relative to expected with 4+/5 in UE  And 3/5 in hip flexors, 4/5 quads.      Lymphadenopathy:     He has no cervical adenopathy.   Neurological: He has normal strength.   Skin: Skin is warm, dry and intact.   Psychiatric: He has a normal mood and affect.             Assessment:       Encounter Diagnoses   Name Primary?    MCTD (mixed connective tissue disease) Yes    Pulmonary fibrosis     Steroid-induced osteoporosis           Plan:        MCTD (mixed connective tissue disease)    Pulmonary fibrosis    Steroid-induced osteoporosis  -     DXA Bone Density Spine And Hip; Future; Expected date: 06/26/2019       Patient with idiopathic pulmonary fibrosis. With + JOSE MANUEL, elevated CRP. + U1RNP consistent with MCTD  Extensive inflammatory myopathy labs returning today during visit and only thing positive continues to be the RNP antibody given this high titer be most consistent of a mixed connective tissue disease - he has no inflammatory myopathy associated antibodies.    He does also have extensive cervical spondylosis/stenosis that could be cause of some of weakness in LE.      His breathing is stable doing well on Prednsione 7mg daily which seems to be his best dose    I am going to leave Hydroxychloroquine 200mg daily (eye exam last  in 11/2018) I am not sure this will offer much control for progression of his lungs but do not feel Mycophenolate at this juncture to be  prudent given risk of infection and recent skin cancer. Will send to Dr. Tavarez to be sure he is aware on  Plaquenil.     Update DXA- patient with recent T12 compression fx s/p kypho   + dysphagia and esophagitis not bisphosphonate candidate   Will give info on PROLIA.     Recent Squamous cell carcinoma. Still scalp healing. Somewhat delayed will see Dr. Keys tomorrow.     Follow up in about 5 months (around 11/26/2019).      30min consultation with greater than 50% spent in counseling, chart review and coordination of care. All questions answered.

## 2019-07-01 ENCOUNTER — HOSPITAL ENCOUNTER (OUTPATIENT)
Dept: RADIOLOGY | Facility: HOSPITAL | Age: 84
Discharge: HOME | End: 2019-07-01
Attending: INTERNAL MEDICINE
Payer: MEDICARE

## 2019-07-01 DIAGNOSIS — M81.8 STEROID-INDUCED OSTEOPOROSIS: ICD-10-CM

## 2019-07-01 DIAGNOSIS — T38.0X5A STEROID-INDUCED OSTEOPOROSIS: ICD-10-CM

## 2019-07-01 PROCEDURE — 77080 DEXA BONE DENSITY SPINE HIP: ICD-10-PCS | Mod: 26,,, | Performed by: RADIOLOGY

## 2019-07-01 PROCEDURE — 77080 DXA BONE DENSITY AXIAL: CPT | Mod: 26,,, | Performed by: RADIOLOGY

## 2019-07-01 PROCEDURE — 77080 DXA BONE DENSITY AXIAL: CPT | Mod: TC,PO

## 2019-10-09 DIAGNOSIS — M35.1 MCTD (MIXED CONNECTIVE TISSUE DISEASE): ICD-10-CM

## 2019-10-09 RX ORDER — HYDROXYCHLOROQUINE SULFATE 200 MG/1
200 TABLET, FILM COATED ORAL DAILY
Qty: 30 TABLET | Refills: 6 | Status: SHIPPED | OUTPATIENT
Start: 2019-10-09 | End: 2020-10-08

## 2019-10-31 ENCOUNTER — HOSPITAL ENCOUNTER (OUTPATIENT)
Dept: RADIOLOGY | Facility: HOSPITAL | Age: 84
Discharge: HOME | End: 2019-10-31
Attending: INTERNAL MEDICINE
Payer: MEDICARE

## 2019-10-31 DIAGNOSIS — R91.8 LUNG MASS: ICD-10-CM

## 2019-10-31 DIAGNOSIS — J84.9 ACUTE INTERSTITIAL PNEUMONIA: ICD-10-CM

## 2019-10-31 DIAGNOSIS — G14 POST POLIOMYELITIS SYNDROME: ICD-10-CM

## 2019-10-31 DIAGNOSIS — J96.11 CHRONIC HYPOXEMIC RESPIRATORY FAILURE: ICD-10-CM

## 2019-10-31 PROCEDURE — 71250 CT THORAX DX C-: CPT | Mod: TC,PO

## 2019-10-31 PROCEDURE — 71250 CT THORAX DX C-: CPT | Mod: 26,,, | Performed by: RADIOLOGY

## 2019-10-31 PROCEDURE — 71250 CT CHEST WITHOUT CONTRAST: ICD-10-PCS | Mod: 26,,, | Performed by: RADIOLOGY

## 2019-11-13 ENCOUNTER — TELEPHONE (OUTPATIENT)
Dept: HOME HEALTH SERVICES | Facility: HOSPITAL | Age: 84
End: 2019-11-13

## 2019-11-19 PROBLEM — Z91.81 AT HIGH RISK FOR INJURY RELATED TO FALL: Status: ACTIVE | Noted: 2019-11-19

## 2019-11-19 PROBLEM — R79.89 PRERENAL AZOTEMIA: Status: ACTIVE | Noted: 2019-11-19

## 2019-11-19 PROBLEM — R53.1 WEAKNESS: Status: ACTIVE | Noted: 2019-11-19

## 2019-11-19 PROBLEM — S32.020A COMPRESSION FRACTURE OF L2: Status: ACTIVE | Noted: 2019-11-19

## 2019-11-19 PROBLEM — D72.829 LEUKOCYTOSIS: Status: ACTIVE | Noted: 2019-11-19

## 2019-11-19 PROBLEM — K59.03 DRUG-INDUCED CONSTIPATION: Status: ACTIVE | Noted: 2019-11-19

## 2019-11-19 PROBLEM — M85.80 OSTEOPENIA: Status: ACTIVE | Noted: 2019-11-19

## 2019-11-19 PROBLEM — M54.50 LOW BACK PAIN: Status: ACTIVE | Noted: 2019-11-19

## 2019-11-19 PROBLEM — J84.112 IDIOPATHIC PULMONARY FIBROSIS: Status: ACTIVE | Noted: 2019-11-19

## 2019-11-19 PROBLEM — L89.302 PRESSURE INJURY OF BUTTOCK, STAGE 2: Status: ACTIVE | Noted: 2019-11-19

## 2019-11-19 PROBLEM — S32.040A COMPRESSION FRACTURE OF L4 VERTEBRA: Status: ACTIVE | Noted: 2019-11-19

## 2019-11-19 PROBLEM — S32.050A COMPRESSION FRACTURE OF L5 VERTEBRA: Status: ACTIVE | Noted: 2019-11-19

## 2019-11-21 PROBLEM — D72.829 LEUKOCYTOSIS: Status: RESOLVED | Noted: 2019-11-19 | Resolved: 2019-11-21

## 2019-11-21 PROBLEM — S22.019A: Status: ACTIVE | Noted: 2019-11-21

## 2019-11-22 PROBLEM — I65.22 STENOSIS OF LEFT CAROTID ARTERY: Status: ACTIVE | Noted: 2019-11-22

## 2019-11-30 PROBLEM — E03.9 HYPOTHYROID: Status: ACTIVE | Noted: 2019-11-30

## 2019-11-30 PROBLEM — M85.80 OSTEOPENIA: Status: RESOLVED | Noted: 2019-11-19 | Resolved: 2019-11-30

## 2019-11-30 PROBLEM — L89.302 PRESSURE INJURY OF BUTTOCK, STAGE 2: Status: RESOLVED | Noted: 2019-11-19 | Resolved: 2019-11-30

## 2019-11-30 PROBLEM — I65.22 STENOSIS OF LEFT CAROTID ARTERY: Status: RESOLVED | Noted: 2019-11-22 | Resolved: 2019-11-30

## 2019-11-30 PROBLEM — R79.89 PRERENAL AZOTEMIA: Status: RESOLVED | Noted: 2019-11-19 | Resolved: 2019-11-30

## 2019-11-30 PROBLEM — K59.03 DRUG-INDUCED CONSTIPATION: Status: RESOLVED | Noted: 2019-11-19 | Resolved: 2019-11-30

## 2019-12-10 ENCOUNTER — TELEPHONE (OUTPATIENT)
Dept: HOME HEALTH SERVICES | Facility: HOSPITAL | Age: 84
End: 2019-12-10

## 2020-08-06 ENCOUNTER — TELEPHONE (OUTPATIENT)
Dept: RHEUMATOLOGY | Facility: CLINIC | Age: 85
End: 2020-08-06

## 2020-08-06 DIAGNOSIS — M35.1 MCTD (MIXED CONNECTIVE TISSUE DISEASE): Primary | ICD-10-CM

## 2020-08-06 DIAGNOSIS — M81.8 STEROID-INDUCED OSTEOPOROSIS: ICD-10-CM

## 2020-08-06 DIAGNOSIS — T38.0X5A STEROID-INDUCED OSTEOPOROSIS: ICD-10-CM

## 2020-08-06 NOTE — TELEPHONE ENCOUNTER
----- Message from Princess ALDO Ulloa sent at 8/6/2020  3:00 PM CDT -----  Contact: Vero Collier (Spouse)  Type: Needs Medical Advice  Who Called:  Vero Collier (Spouse)  Best Call Back Number: [phone] (home)     Additional Information: requesting a call in regards to getting lab orders as well as scheduling a appt. Please advise    Left message on voicemail that he is booked 8-13-20 at 11am. Asking for call back to discuss lab date and time. CG

## 2020-08-07 ENCOUNTER — TELEPHONE (OUTPATIENT)
Dept: RHEUMATOLOGY | Facility: CLINIC | Age: 85
End: 2020-08-07

## 2020-08-07 NOTE — TELEPHONE ENCOUNTER
----- Message from Tri Boland sent at 8/7/2020  9:14 AM CDT -----  Type: Needs Medical Advice  Who Called:  Vero Collier (Spouse)  Best Call Back Number: [phone]  Additional Information: Calling to request that the patient's lab orders be sent over to his home health agency so that they can draw them when they come. [facility], their phone number , fax is .

## 2020-08-11 ENCOUNTER — LAB VISIT (OUTPATIENT)
Dept: LAB | Facility: HOSPITAL | Age: 85
End: 2020-08-11
Attending: INTERNAL MEDICINE
Payer: MEDICARE

## 2020-08-11 DIAGNOSIS — H40.60X0 CORTICOSTEROID-INDUCED GLAUCOMA, RESIDUAL STAGE: ICD-10-CM

## 2020-08-11 DIAGNOSIS — M35.1 MIXED CONNECTIVE TISSUE DISEASE: Primary | ICD-10-CM

## 2020-08-11 DIAGNOSIS — T38.0X5A CORTICOSTEROID-INDUCED GLAUCOMA, RESIDUAL STAGE: ICD-10-CM

## 2020-08-11 DIAGNOSIS — M81.8 IDIOPATHIC OSTEOPOROSIS: ICD-10-CM

## 2020-08-11 LAB
ALBUMIN SERPL BCP-MCNC: 3.8 G/DL (ref 3.5–5.2)
ALP SERPL-CCNC: 73 U/L (ref 55–135)
ALT SERPL W/O P-5'-P-CCNC: 23 U/L (ref 10–44)
ANION GAP SERPL CALC-SCNC: 10 MMOL/L (ref 8–16)
AST SERPL-CCNC: 18 U/L (ref 10–40)
BASOPHILS # BLD AUTO: 0.09 K/UL (ref 0–0.2)
BASOPHILS NFR BLD: 0.8 % (ref 0–1.9)
BILIRUB SERPL-MCNC: 0.4 MG/DL (ref 0.1–1)
BUN SERPL-MCNC: 23 MG/DL (ref 8–23)
CALCIUM SERPL-MCNC: 9.8 MG/DL (ref 8.7–10.5)
CHLORIDE SERPL-SCNC: 100 MMOL/L (ref 95–110)
CO2 SERPL-SCNC: 29 MMOL/L (ref 23–29)
CREAT SERPL-MCNC: 0.9 MG/DL (ref 0.5–1.4)
CRP SERPL-MCNC: 2.3 MG/DL
DIFFERENTIAL METHOD: ABNORMAL
EOSINOPHIL # BLD AUTO: 0.3 K/UL (ref 0–0.5)
EOSINOPHIL NFR BLD: 2.9 % (ref 0–8)
ERYTHROCYTE [DISTWIDTH] IN BLOOD BY AUTOMATED COUNT: 13.2 % (ref 11.5–14.5)
ERYTHROCYTE [SEDIMENTATION RATE] IN BLOOD BY WESTERGREN METHOD: 12 MM/HR (ref 0–10)
EST. GFR  (AFRICAN AMERICAN): >60 ML/MIN/1.73 M^2
EST. GFR  (NON AFRICAN AMERICAN): >60 ML/MIN/1.73 M^2
GLUCOSE SERPL-MCNC: 61 MG/DL (ref 70–110)
HCT VFR BLD AUTO: 45.9 % (ref 40–54)
HGB BLD-MCNC: 14.2 G/DL (ref 14–18)
IMM GRANULOCYTES # BLD AUTO: 0.15 K/UL (ref 0–0.04)
IMM GRANULOCYTES NFR BLD AUTO: 1.3 % (ref 0–0.5)
LYMPHOCYTES # BLD AUTO: 3.8 K/UL (ref 1–4.8)
LYMPHOCYTES NFR BLD: 34 % (ref 18–48)
MCH RBC QN AUTO: 31 PG (ref 27–31)
MCHC RBC AUTO-ENTMCNC: 30.9 G/DL (ref 32–36)
MCV RBC AUTO: 100 FL (ref 82–98)
MONOCYTES # BLD AUTO: 0.9 K/UL (ref 0.3–1)
MONOCYTES NFR BLD: 8.1 % (ref 4–15)
NEUTROPHILS # BLD AUTO: 5.9 K/UL (ref 1.8–7.7)
NEUTROPHILS NFR BLD: 52.9 % (ref 38–73)
NRBC BLD-RTO: 0 /100 WBC
PLATELET # BLD AUTO: 252 K/UL (ref 150–350)
PMV BLD AUTO: 9.4 FL (ref 9.2–12.9)
POTASSIUM SERPL-SCNC: 3.8 MMOL/L (ref 3.5–5.1)
PROT SERPL-MCNC: 6.5 G/DL (ref 6–8.4)
RBC # BLD AUTO: 4.58 M/UL (ref 4.6–6.2)
SODIUM SERPL-SCNC: 139 MMOL/L (ref 136–145)
WBC # BLD AUTO: 11.22 K/UL (ref 3.9–12.7)

## 2020-08-11 PROCEDURE — 85025 COMPLETE CBC W/AUTO DIFF WBC: CPT

## 2020-08-11 PROCEDURE — 86140 C-REACTIVE PROTEIN: CPT

## 2020-08-11 PROCEDURE — 85651 RBC SED RATE NONAUTOMATED: CPT

## 2020-08-11 PROCEDURE — 36415 COLL VENOUS BLD VENIPUNCTURE: CPT

## 2020-08-11 PROCEDURE — 80053 COMPREHEN METABOLIC PANEL: CPT

## 2020-08-13 ENCOUNTER — OFFICE VISIT (OUTPATIENT)
Dept: RHEUMATOLOGY | Facility: CLINIC | Age: 85
End: 2020-08-13
Payer: MEDICARE

## 2020-08-13 VITALS — HEIGHT: 72 IN | BODY MASS INDEX: 19.65 KG/M2 | WEIGHT: 145.06 LBS

## 2020-08-13 DIAGNOSIS — Z51.5 PALLIATIVE CARE BY SPECIALIST: ICD-10-CM

## 2020-08-13 DIAGNOSIS — S22.080S COMPRESSION FRACTURE OF T12 VERTEBRA, SEQUELA: ICD-10-CM

## 2020-08-13 DIAGNOSIS — M35.1 MCTD (MIXED CONNECTIVE TISSUE DISEASE): ICD-10-CM

## 2020-08-13 DIAGNOSIS — J84.112 IDIOPATHIC PULMONARY FIBROSIS: Primary | ICD-10-CM

## 2020-08-13 PROCEDURE — 99214 PR OFFICE/OUTPT VISIT, EST, LEVL IV, 30-39 MIN: ICD-10-PCS | Mod: 95,,, | Performed by: INTERNAL MEDICINE

## 2020-08-13 PROCEDURE — 99214 OFFICE O/P EST MOD 30 MIN: CPT | Mod: 95,,, | Performed by: INTERNAL MEDICINE

## 2020-08-13 RX ORDER — OXYCODONE HYDROCHLORIDE 5 MG/1
5 CAPSULE ORAL EVERY 6 HOURS PRN
COMMUNITY

## 2020-08-13 NOTE — PROGRESS NOTES
Subjective:          Chief Complaint: Fly Collier is a 84 y.o. male who had concerns including Disease Management.    HPI:    Patient is an 82-year-old gentleman is being referred by pulmonology for elevated inflammatory markers with a high titer positive JOSE MANUEL 1:1280 speckled, neg ANCA, neg RF, ACE leve WNL  Now with IPF on higher. Breathing stable.   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    On HCQ 200mg daily  Prednisone 20mg daily-when decreased to 10mg but wife noted he becomes weak with legs. He does note some gains with physical therapy.   0xygen 3L NC-   Sustained another vertebral fracture s/p kypho      Patient with prolonged stay in SNF 12/2019-1/2020 and now home.   Initial labs returning from me do show a very high titered SM/RNP which can be associated with mixed connective tissue diseases on the other thought being myositis his initial Airam 1 was negative and have the patient return to order mild marker 3 panel which includes in SRP and other myositis specific antibodies.     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    To reiterate the history:  Patient states that in 2015 he had a c3-7 cervical fusion with myelopathy and cervical stenosis , and hx of polio as a child. Patient and wife note that since 6/2017 patient continued to be progressively weaker with decline in physical function. Patient did have weakness secondary due to his cervical stenosis. Did follow with Dr. Anthony (Neurolgoy)  MRI cervical spine:   Denies cough, cold, fevers. Patient found to have suspected pneumonia, intubated with respiratory failure with SOB that was rather acute symptomatically.   Patient was treated for PNA and was able to be extubated was in [facility] rehab July and August.   He continues on O2 now. Pending updated   Patient did have weight loss following cervical surgery but   Had penile ulcerations upon discharge to  [facility]. Some minor hx of penile ulcers, but no oral ulcerations. No hx of retinal vascultiis-follows with Dr. Pedroza. Neg recent exam  No further outbreak with ulcers. Has seen dermatology with Dr. ESTEFANIA Keys.   No hx of seizures, strokes. But was diagnosed in 1990 with suspected Parkinson's, ultimately discontinued as worse on therapy. Did have second opinion  No hx of anemia,   No hx of nephritis, no hx o  No photosensitivity, hx of Melanoma remotely.       Patient is an  no exposure to berrylium, some asbestos. No other   Patient is CT of chest 8/22/2017 that showed subpleural fibrosis throughout both lungs but no specific lobar consolidation or pneumonia no pleural effusions.  He had no enlarged lymph nodes.  Component      Latest Ref Rng & Units 2/26/2018 11/1/2017   Anti Sm/RNP Antibody      0.00 - 19.99 .94 (H)    Anti-Sm/RNP Interpretation      Negative Positive (A)    Anti Sm Antibody      0.00 - 19.99 EU 3.48    Anti-Sm Interpretation      Negative Negative    Anti-SSA Antibody      0.00 - 19.99 EU 1.75    Anti-SSA Interpretation      Negative Negative    Anti-SSB Antibody      0.00 - 19.99 EU 0.56    Anti-SSB Interpretation      Negative Negative    JOSE MANUEL IgG by IFA      <1:40     Sed Rate      0 - 10 mm/Hr 20 (H) 8   CRP      0.00 - 0.90 mg/dL  1.30 (H)   Anti [facility]-1      0 - 40 AU/mL  0   Anti-neutrophil Cytoplasmic Antibody, IgG      <1:20  <1:20   JOSE MANUEL Screen      Negative <1:160 Positive (A)    ds DNA Ab      Negative 1:10 Negative 1:10    Anti-Histone Antibody      0.0 - 0.9 Units 0.3    Complement (C-3)      50 - 180 mg/dL 142    Complement (C-4)      11 - 44 mg/dL 36    CPK      20 - 200 U/L 21    LD      110 - 260 U/L 233    Complement,Total, Serum      42 - 95 U/mL 88    CCP Antibodies      <5.0 U/mL <0.5    JOSE MANUEL HEP-2 Titer       Positive 1:1280 Speckled      Component      Latest Ref Rng & Units 8/22/2017   Anti Sm/RNP Antibody      0.00 - 19.99 EU    Anti-Sm/RNP  Interpretation      Negative    Anti Sm Antibody      0.00 - 19.99 EU    Anti-Sm Interpretation      Negative    Anti-SSA Antibody      0.00 - 19.99 EU    Anti-SSA Interpretation      Negative    Anti-SSB Antibody      0.00 - 19.99 EU    Anti-SSB Interpretation      Negative    JOSE MANUEL IgG by IFA      <1:40 1:1280 (H)   Sed Rate      0 - 10 mm/Hr    CRP      0.00 - 0.90 mg/dL    Anti [facility]-1      0 - 40 AU/mL    Anti-neutrophil Cytoplasmic Antibody, IgG      <1:20    JOSE MANUEL Screen      Negative <1:160    ds DNA Ab      Negative 1:10    Anti-Histone Antibody      0.0 - 0.9 Units    Complement (C-3)      50 - 180 mg/dL    Complement (C-4)      11 - 44 mg/dL    CPK      20 - 200 U/L    LD      110 - 260 U/L    Complement,Total, Serum      42 - 95 U/mL    CCP Antibodies      <5.0 U/mL    JOSE MANUEL HEP-2 Titer                Component      Latest Ref Rng & Units 11/1/2017 8/22/2017   Angio Convert Enzyme      9 - 67 U/L  38   JOSE MANUEL Screen      None Detected  Detected (A)   CRP      0.00 - 0.90 mg/dL 1.30 (H) 2.70 (H)   Sed Rate      0 - 19 mm/Hr 8 10   Rheumatoid Factor      0.0 - 15.0 IU/mL  <8.6   JOSE MANUEL IgG by IFA      <1:40  1:1280 (H)   Anti [facility]-1      0 - 40 AU/mL 0    Anti-neutrophil Cytoplasmic Antibody, IgG      <1:20 <1:20      REVIEW OF SYSTEMS:    Review of Systems   Constitutional: Negative for fever, malaise/fatigue and weight loss.   HENT: Negative for sore throat.    Eyes: Negative for double vision, photophobia and redness.   Respiratory: Positive for shortness of breath. Negative for cough and wheezing.    Cardiovascular: Negative for chest pain, palpitations and orthopnea.   Gastrointestinal: Negative for abdominal pain, constipation and diarrhea.   Genitourinary: Negative for dysuria, hematuria and urgency.   Musculoskeletal: Positive for myalgias. Negative for back pain and joint pain.        Weakness     Skin: Negative for rash.   Neurological: Negative for dizziness, tingling, focal weakness and headaches.    Endo/Heme/Allergies: Does not bruise/bleed easily.   Psychiatric/Behavioral: Negative for depression, hallucinations and suicidal ideas.               Objective:            Past Medical History:   Diagnosis Date    BPH (benign prostatic hyperplasia)     Cancer 2018    scca head with radiation    Cancer     melanoma on arm    Fine motor skill loss     General anesthetics causing adverse effect in therapeutic use     hallucinations for 4 days following neck surgery    GERD (gastroesophageal reflux disease)     H/O therapeutic radiation     H/O: pneumonia 2017    Hypothyroid     Idiopathic pulmonary fibrosis     MCTD (mixed connective tissue disease) 3/7/2018    Neck pain     Oxygen dependent     Polio     Urinary urgency      Family History   Problem Relation Age of Onset    Multiple sclerosis Mother     COPD Father      Social History     Tobacco Use    Smoking status: Former Smoker     Years: 32.00    Smokeless tobacco: Never Used   Substance Use Topics    Alcohol use: Not Currently    Drug use: No         Current Outpatient Medications on File Prior to Visit   Medication Sig Dispense Refill    docusate sodium (COLACE) 100 MG capsule Take 1 capsule (100 mg total) by mouth 2 (two) times daily as needed for Constipation.  0    hydroxychloroquine (PLAQUENIL) 200 mg tablet Take 1 tablet (200 mg total) by mouth once daily. 30 tablet 6    levothyroxine (SYNTHROID) 150 MCG tablet Take 150 mcg by mouth before breakfast.      oxybutynin (DITROPAN) 5 MG Tab Take 5 mg by mouth 2 (two) times daily.  3    oxyCODONE (OXY-IR) 5 mg Cap Take 5 mg by mouth every 6 (six) hours as needed for Pain.      OXYGEN-AIR DELIVERY SYSTEMS MISC 2 L by Nasal route continuous.      predniSONE (DELTASONE) 10 MG tablet Take 2 tablets (20 mg total) by mouth once daily. 60 tablet 3    senna (SENNA) 8.6 mg tablet Take 1 tablet by mouth once daily.      sertraline (ZOLOFT) 50 MG tablet Take 50 mg by mouth every evening.       albuterol (PROVENTIL) 2.5 mg /3 mL (0.083 %) nebulizer solution Take 2.5 mg by nebulization 3 (three) times daily as needed for Wheezing.   5    atorvastatin (LIPITOR) 20 MG tablet Take 1 tablet (20 mg total) by mouth every evening. 90 tablet 3    HYDROcodone-acetaminophen (NORCO)  mg per tablet Take 1 tablet by mouth every 4 (four) hours as needed for Pain. 10 tablet 0    lactulose (CHRONULAC) 10 gram/15 mL solution Take 15 mLs by mouth nightly as needed (constipation).   2    olmesartan (BENICAR) 5 MG Tab Take 1 tablet (5 mg total) by mouth once daily. 90 tablet 3    omeprazole (PRILOSEC) 40 MG capsule Take 40 mg by mouth daily as needed (acid reflux).       predniSONE (DELTASONE) 5 MG tablet Take 3 tablets (15 mg total) by mouth once daily.       Current Facility-Administered Medications on File Prior to Visit   Medication Dose Route Frequency Provider Last Rate Last Dose    lactated ringers infusion   Intravenous Continuous Kyler Castro MD           There were no vitals filed for this visit.    Physical Exam:    Physical Exam  Constitutional:       Appearance: He is well-developed.   Eyes:      General: Lids are normal.   Neurological:      Mental Status: He is oriented to person, place, and time.   Psychiatric:         Behavior: Behavior normal.         Thought Content: Thought content normal.               Assessment:       Encounter Diagnoses   Name Primary?    Idiopathic pulmonary fibrosis Yes    MCTD (mixed connective tissue disease)     Palliative care by specialist     Compression fracture of T12 vertebra, sequela           Plan:        Idiopathic pulmonary fibrosis    MCTD (mixed connective tissue disease)    Palliative care by specialist    Compression fracture of T12 vertebra, sequela       Patient with idiopathic pulmonary fibrosis. With + JOSE MANUEL, elevated CRP. + U1RNP consistent with MCTD   given his currently condition with lungs. He is stable but declined since our last  visit. Doing well at home with wife.      I am going to leave Hydroxychloroquine 200mg daily (eye exam last  in 11/2018) I am not sure this will offer much control for progression of his lungs but do not feel Mycophenolate at this juncture to be prudent given risk of infection and recent skin cancer. Will send to Dr. Tavarez to be sure he is aware on  Plaquenil.       No follow-ups on file.      30min consultation with greater than 50% spent in counseling, chart review and coordination of care. All questions answered.    The patient location is: Home [place]  The chief complaint leading to consultation is: medication management and f/u   Visit type: Virtual visit with synchronous audio and video  Total time spent with patient: 30  Each patient to whom he or she provides medical services by telemedicine is:  (1) informed of the relationship between the physician and patient and the respective role of any other health care provider with respect to management of the patient; and (2) notified that he or she may decline to receive medical services by telemedicine and may withdraw from such care at any time.    Notes:   As above.